# Patient Record
Sex: FEMALE | Race: WHITE | Employment: OTHER | URBAN - METROPOLITAN AREA
[De-identification: names, ages, dates, MRNs, and addresses within clinical notes are randomized per-mention and may not be internally consistent; named-entity substitution may affect disease eponyms.]

---

## 2017-11-13 ENCOUNTER — APPOINTMENT (OUTPATIENT)
Dept: CT IMAGING | Age: 62
DRG: 432 | End: 2017-11-13
Payer: COMMERCIAL

## 2017-11-13 ENCOUNTER — HOSPITAL ENCOUNTER (INPATIENT)
Age: 62
LOS: 3 days | Discharge: HOME | DRG: 432 | End: 2017-11-16
Attending: EMERGENCY MEDICINE | Admitting: FAMILY MEDICINE
Payer: COMMERCIAL

## 2017-11-13 DIAGNOSIS — R10.84 GENERALIZED ABDOMINAL PAIN: ICD-10-CM

## 2017-11-13 DIAGNOSIS — K72.00 ACUTE LIVER FAILURE WITHOUT HEPATIC COMA: Primary | ICD-10-CM

## 2017-11-13 DIAGNOSIS — R17 JAUNDICE, NON-NEONATAL: ICD-10-CM

## 2017-11-13 LAB
-: ABNORMAL
ABSOLUTE EOS #: 0.1 K/UL (ref 0–0.4)
ABSOLUTE IMMATURE GRANULOCYTE: ABNORMAL K/UL (ref 0–0.3)
ABSOLUTE LYMPH #: 1 K/UL (ref 1–4.8)
ABSOLUTE MONO #: 0.7 K/UL (ref 0.2–0.8)
ALBUMIN SERPL-MCNC: 3.4 G/DL (ref 3.5–5.2)
ALBUMIN/GLOBULIN RATIO: ABNORMAL (ref 1–2.5)
ALP BLD-CCNC: 228 U/L (ref 35–104)
ALT SERPL-CCNC: 89 U/L (ref 5–33)
AMMONIA: 56 UMOL/L (ref 11–51)
AMORPHOUS: ABNORMAL
ANION GAP SERPL CALCULATED.3IONS-SCNC: 18 MMOL/L (ref 9–17)
AST SERPL-CCNC: 226 U/L
BACTERIA: ABNORMAL
BASOPHILS # BLD: 0 %
BASOPHILS ABSOLUTE: 0 K/UL (ref 0–0.2)
BILIRUB SERPL-MCNC: 14.14 MG/DL (ref 0.3–1.2)
BILIRUBIN DIRECT: YES MG/DL
BILIRUBIN URINE: ABNORMAL
BILIRUBIN, INDIRECT: ABNORMAL MG/DL (ref 0–1)
BUN BLDV-MCNC: 14 MG/DL (ref 8–23)
BUN/CREAT BLD: 21 (ref 9–20)
CALCIUM SERPL-MCNC: 9.1 MG/DL (ref 8.6–10.4)
CASTS UA: ABNORMAL /LPF
CHLORIDE BLD-SCNC: 84 MMOL/L (ref 98–107)
CO2: 28 MMOL/L (ref 20–31)
COLOR: ABNORMAL
COMMENT UA: ABNORMAL
CREAT SERPL-MCNC: 0.67 MG/DL (ref 0.5–0.9)
CRYSTALS, UA: ABNORMAL /HPF
DIFFERENTIAL TYPE: ABNORMAL
EOSINOPHILS RELATIVE PERCENT: 1 %
EPITHELIAL CELLS UA: ABNORMAL /HPF
GFR AFRICAN AMERICAN: >60 ML/MIN
GFR NON-AFRICAN AMERICAN: >60 ML/MIN
GFR SERPL CREATININE-BSD FRML MDRD: ABNORMAL ML/MIN/{1.73_M2}
GFR SERPL CREATININE-BSD FRML MDRD: ABNORMAL ML/MIN/{1.73_M2}
GLOBULIN: ABNORMAL G/DL (ref 1.5–3.8)
GLUCOSE BLD-MCNC: 121 MG/DL (ref 70–99)
GLUCOSE URINE: ABNORMAL
HAV IGM SER IA-ACNC: NONREACTIVE
HCT VFR BLD CALC: 33.1 % (ref 36–46)
HEMOGLOBIN: 11.7 G/DL (ref 12–16)
HEPATITIS B CORE IGM ANTIBODY: NONREACTIVE
HEPATITIS B SURFACE ANTIGEN: NONREACTIVE
HEPATITIS C ANTIBODY: NONREACTIVE
IMMATURE GRANULOCYTES: ABNORMAL %
INR BLD: 1.6
KETONES, URINE: ABNORMAL
LEUKOCYTE ESTERASE, URINE: ABNORMAL
LIPASE: 102 U/L (ref 13–60)
LYMPHOCYTES # BLD: 13 %
MCH RBC QN AUTO: 39.4 PG (ref 26–34)
MCHC RBC AUTO-ENTMCNC: 35.3 G/DL (ref 31–37)
MCV RBC AUTO: 111.9 FL (ref 80–100)
MONOCYTES # BLD: 9 %
MUCUS: ABNORMAL
NITRITE, URINE: ABNORMAL
OTHER OBSERVATIONS UA: ABNORMAL
PARTIAL THROMBOPLASTIN TIME: 29.9 SEC (ref 23–31)
PDW BLD-RTO: 15.5 % (ref 11.5–14.5)
PH UA: 5.5 (ref 5–8)
PLATELET # BLD: 87 K/UL (ref 130–400)
PLATELET ESTIMATE: ABNORMAL
PMV BLD AUTO: 8.4 FL (ref 6–12)
POTASSIUM SERPL-SCNC: 3.3 MMOL/L (ref 3.7–5.3)
PROTEIN UA: ABNORMAL
PROTHROMBIN TIME: 16.7 SEC (ref 9.7–11.6)
RBC # BLD: 2.96 M/UL (ref 4–5.2)
RBC # BLD: ABNORMAL 10*6/UL
RBC UA: ABNORMAL /HPF (ref 0–2)
RENAL EPITHELIAL, UA: ABNORMAL /HPF
SEG NEUTROPHILS: 77 %
SEGMENTED NEUTROPHILS ABSOLUTE COUNT: 5.9 K/UL (ref 1.8–7.7)
SODIUM BLD-SCNC: 130 MMOL/L (ref 135–144)
SPECIFIC GRAVITY UA: 1.01 (ref 1–1.03)
TOTAL PROTEIN: 7.1 G/DL (ref 6.4–8.3)
TRICHOMONAS: ABNORMAL
TURBIDITY: CLEAR
URINE HGB: ABNORMAL
UROBILINOGEN, URINE: ABNORMAL
WBC # BLD: 7.7 K/UL (ref 3.5–11)
WBC # BLD: ABNORMAL 10*3/UL
WBC UA: ABNORMAL /HPF (ref 0–5)
YEAST: ABNORMAL

## 2017-11-13 PROCEDURE — 2580000003 HC RX 258: Performed by: FAMILY MEDICINE

## 2017-11-13 PROCEDURE — 80048 BASIC METABOLIC PNL TOTAL CA: CPT

## 2017-11-13 PROCEDURE — 6360000004 HC RX CONTRAST MEDICATION: Performed by: EMERGENCY MEDICINE

## 2017-11-13 PROCEDURE — 80074 ACUTE HEPATITIS PANEL: CPT

## 2017-11-13 PROCEDURE — 83690 ASSAY OF LIPASE: CPT

## 2017-11-13 PROCEDURE — 2500000003 HC RX 250 WO HCPCS: Performed by: EMERGENCY MEDICINE

## 2017-11-13 PROCEDURE — 96375 TX/PRO/DX INJ NEW DRUG ADDON: CPT

## 2017-11-13 PROCEDURE — 1200000000 HC SEMI PRIVATE

## 2017-11-13 PROCEDURE — 80076 HEPATIC FUNCTION PANEL: CPT

## 2017-11-13 PROCEDURE — 96366 THER/PROPH/DIAG IV INF ADDON: CPT

## 2017-11-13 PROCEDURE — 85730 THROMBOPLASTIN TIME PARTIAL: CPT

## 2017-11-13 PROCEDURE — 6370000000 HC RX 637 (ALT 250 FOR IP): Performed by: FAMILY MEDICINE

## 2017-11-13 PROCEDURE — 82140 ASSAY OF AMMONIA: CPT

## 2017-11-13 PROCEDURE — 85025 COMPLETE CBC W/AUTO DIFF WBC: CPT

## 2017-11-13 PROCEDURE — 99285 EMERGENCY DEPT VISIT HI MDM: CPT

## 2017-11-13 PROCEDURE — 85610 PROTHROMBIN TIME: CPT

## 2017-11-13 PROCEDURE — 96365 THER/PROPH/DIAG IV INF INIT: CPT

## 2017-11-13 PROCEDURE — 74177 CT ABD & PELVIS W/CONTRAST: CPT

## 2017-11-13 PROCEDURE — C9113 INJ PANTOPRAZOLE SODIUM, VIA: HCPCS | Performed by: FAMILY MEDICINE

## 2017-11-13 PROCEDURE — 6360000002 HC RX W HCPCS: Performed by: EMERGENCY MEDICINE

## 2017-11-13 PROCEDURE — 6360000002 HC RX W HCPCS: Performed by: FAMILY MEDICINE

## 2017-11-13 PROCEDURE — 2580000003 HC RX 258: Performed by: EMERGENCY MEDICINE

## 2017-11-13 PROCEDURE — 81001 URINALYSIS AUTO W/SCOPE: CPT

## 2017-11-13 RX ORDER — METOPROLOL SUCCINATE 25 MG/1
25 TABLET, EXTENDED RELEASE ORAL DAILY
Status: DISCONTINUED | OUTPATIENT
Start: 2017-11-13 | End: 2017-11-16 | Stop reason: HOSPADM

## 2017-11-13 RX ORDER — 0.9 % SODIUM CHLORIDE 0.9 %
1000 INTRAVENOUS SOLUTION INTRAVENOUS ONCE
Status: COMPLETED | OUTPATIENT
Start: 2017-11-13 | End: 2017-11-13

## 2017-11-13 RX ORDER — ASPIRIN 325 MG
325 TABLET ORAL EVERY EVENING
Status: ON HOLD | COMMUNITY
End: 2017-11-16 | Stop reason: HOSPADM

## 2017-11-13 RX ORDER — LACTULOSE 10 G/15ML
10 SOLUTION ORAL 3 TIMES DAILY
Status: DISCONTINUED | OUTPATIENT
Start: 2017-11-13 | End: 2017-11-16 | Stop reason: HOSPADM

## 2017-11-13 RX ORDER — 0.9 % SODIUM CHLORIDE 0.9 %
50 INTRAVENOUS SOLUTION INTRAVENOUS ONCE
Status: COMPLETED | OUTPATIENT
Start: 2017-11-13 | End: 2017-11-13

## 2017-11-13 RX ORDER — SODIUM CHLORIDE 9 MG/ML
INJECTION, SOLUTION INTRAVENOUS CONTINUOUS
Status: DISCONTINUED | OUTPATIENT
Start: 2017-11-13 | End: 2017-11-16 | Stop reason: HOSPADM

## 2017-11-13 RX ORDER — SODIUM CHLORIDE 0.9 % (FLUSH) 0.9 %
10 SYRINGE (ML) INJECTION PRN
Status: DISCONTINUED | OUTPATIENT
Start: 2017-11-13 | End: 2017-11-16 | Stop reason: HOSPADM

## 2017-11-13 RX ORDER — SODIUM CHLORIDE 0.9 % (FLUSH) 0.9 %
10 SYRINGE (ML) INJECTION EVERY 12 HOURS SCHEDULED
Status: DISCONTINUED | OUTPATIENT
Start: 2017-11-13 | End: 2017-11-16 | Stop reason: HOSPADM

## 2017-11-13 RX ORDER — ONDANSETRON 2 MG/ML
4 INJECTION INTRAMUSCULAR; INTRAVENOUS EVERY 6 HOURS PRN
Status: DISCONTINUED | OUTPATIENT
Start: 2017-11-13 | End: 2017-11-16 | Stop reason: HOSPADM

## 2017-11-13 RX ORDER — SODIUM CHLORIDE 0.9 % (FLUSH) 0.9 %
10 SYRINGE (ML) INJECTION PRN
Status: DISCONTINUED | OUTPATIENT
Start: 2017-11-13 | End: 2017-11-13 | Stop reason: SDUPTHER

## 2017-11-13 RX ORDER — METOPROLOL SUCCINATE 25 MG/1
25 TABLET, EXTENDED RELEASE ORAL DAILY
Status: ON HOLD | COMMUNITY
End: 2017-12-15 | Stop reason: ALTCHOICE

## 2017-11-13 RX ORDER — ONDANSETRON 2 MG/ML
4 INJECTION INTRAMUSCULAR; INTRAVENOUS EVERY 30 MIN PRN
Status: DISCONTINUED | OUTPATIENT
Start: 2017-11-13 | End: 2017-11-13 | Stop reason: SDUPTHER

## 2017-11-13 RX ADMIN — IOPAMIDOL 125 ML: 755 INJECTION, SOLUTION INTRAVENOUS at 12:18

## 2017-11-13 RX ADMIN — SODIUM CHLORIDE 40 MG: 9 INJECTION, SOLUTION INTRAVENOUS at 17:24

## 2017-11-13 RX ADMIN — SODIUM CHLORIDE 1000 ML: 9 INJECTION, SOLUTION INTRAVENOUS at 10:25

## 2017-11-13 RX ADMIN — ONDANSETRON 4 MG: 2 INJECTION INTRAMUSCULAR; INTRAVENOUS at 10:26

## 2017-11-13 RX ADMIN — FOLIC ACID: 5 INJECTION, SOLUTION INTRAMUSCULAR; INTRAVENOUS; SUBCUTANEOUS at 11:29

## 2017-11-13 RX ADMIN — SODIUM CHLORIDE 50 ML: 9 INJECTION, SOLUTION INTRAVENOUS at 12:18

## 2017-11-13 RX ADMIN — METOPROLOL SUCCINATE 25 MG: 25 TABLET, FILM COATED, EXTENDED RELEASE ORAL at 17:27

## 2017-11-13 RX ADMIN — Medication 10 ML: at 12:18

## 2017-11-13 RX ADMIN — LACTULOSE 10 G: 20 SOLUTION ORAL at 17:24

## 2017-11-13 ASSESSMENT — ENCOUNTER SYMPTOMS
COUGH: 0
COLOR CHANGE: 1
CHEST TIGHTNESS: 0
ABDOMINAL DISTENTION: 1
VOICE CHANGE: 0
SHORTNESS OF BREATH: 0
ABDOMINAL PAIN: 1
NAUSEA: 1

## 2017-11-13 ASSESSMENT — PAIN DESCRIPTION - PAIN TYPE: TYPE: ACUTE PAIN

## 2017-11-13 ASSESSMENT — PAIN SCALES - GENERAL: PAINLEVEL_OUTOF10: 8

## 2017-11-13 ASSESSMENT — PAIN DESCRIPTION - DESCRIPTORS: DESCRIPTORS: ACHING

## 2017-11-13 NOTE — ED NOTES
Pt ambulatory to room 19 with c/o lower abd pain and nausea, onset approximately 3 days ago. Pt states she is \"Not able to hold down any solid foods\" but \"Liquids seem to go down okay\". Pt reports she has been drinking Ensure for past 2 days without difficulty, but is still experiencing nausea and lower abd pain. Skin/eyes jaundiced. Pt denies any EtOH abuse, states \"I have a glass of wine very rarely\". Pt denies any CP, SOB, dizziness, urinary symptoms, or fevers. NAD noted.       Rosa M Russell RN  11/13/17 1007

## 2017-11-13 NOTE — ED PROVIDER NOTES
[facility] ED  eMERGENCY dEPARTMENT eNCOUnter      Pt Name: Db Abbott  MRN: [id number]  [place] 1955  Date of evaluation: 11/13/2017  Provider: Claudeen Juba, MD    CHIEF COMPLAINT       Chief Complaint   Patient presents with    Abdominal Pain         HISTORY OF PRESENT ILLNESS  (Location/Symptom, Timing/Onset, Context/Setting, Quality, Duration, Modifying Factors, Severity.)   Db Abbott is a 57 y.o. female who presents to the emergency department With abdominal pain she states that she is noticed that sure abdomen is distended and hurts diffusely. Feels like she is not eating well she doesn't have much energy. She's noticed no diarrhea but she's had some mild nausea and occasional vomiting. He notes no skin change it's now abnormal or travel history. Does admit to using alcohol on occasion. Nursing Notes were reviewed. ALLERGIES     Review of patient's allergies indicates no known allergies. CURRENT MEDICATIONS       Previous Medications    No medications on file       PAST MEDICAL HISTORY   History reviewed. No pertinent past medical history. SURGICAL HISTORY     History reviewed. No pertinent surgical history. FAMILY HISTORY     History reviewed. No pertinent family history. No family status information on file. SOCIAL HISTORY      reports that she has never smoked. She has never used smokeless tobacco. She reports that she drinks about 0.6 oz of alcohol per week . REVIEW OF SYSTEMS    (2-9 systems for level 4, 10 or more for level 5)     Review of Systems   Constitutional: Positive for activity change and appetite change. HENT: Negative for voice change. Respiratory: Negative for cough, chest tightness and shortness of breath. Cardiovascular: Negative for chest pain. Gastrointestinal: Positive for abdominal distention, abdominal pain and nausea. Genitourinary: Negative for difficulty urinating, dysuria, flank pain and pelvic pain.    Musculoskeletal: Negative. Skin: Positive for color change and pallor. Neurological: Negative for weakness and headaches. Except as noted above the remainder of the review of systems was reviewed and negative. PHYSICAL EXAM    (up to 7 for level 4, 8 or more for level 5)     ED Triage Vitals [11/13/17 0929]   BP Temp Temp src Pulse Resp SpO2 Height Weight   135/82 98 °F (36.7 °C) -- 89 18 97 % 5' 4\" (1.626 m) 145 lb (65.8 kg)       Physical Exam   Constitutional: She is oriented to person, place, and time. She appears well-developed. She is cooperative. HENT:   Head: Normocephalic and atraumatic. Eyes: Pupils are equal, round, and reactive to light. Scleral icterus is present. Neck: Neck supple. No JVD present. No tracheal deviation present. Cardiovascular: Normal rate, regular rhythm and normal heart sounds. No murmur heard. Pulmonary/Chest: Effort normal and breath sounds normal. No respiratory distress. She has no wheezes. Abdominal: She exhibits distension and ascites. There is tenderness. Musculoskeletal: Normal range of motion. Lymphadenopathy:     She has no cervical adenopathy. Neurological: She is alert and oriented to person, place, and time. Skin: Skin is warm, dry and intact. DIAGNOSTIC RESULTS     EKG: All EKG's are interpreted by the Emergency Department Physician who either signs or Co-signs this chart in the absence of a cardiologist.      RADIOLOGY:   Non-plain film images such as CT, Ultrasound and MRI are read by the radiologist. Plain radiographic images are visualized and preliminarily interpreted by the emergency physician with the below findings:      No results found.   Interpretation per the Radiologist below, if available at the time of this note:    CT ABDOMEN PELVIS W IV CONTRAST Additional Contrast? Radiologist Recommendation    (Results Pending)         ED BEDSIDE ULTRASOUND:   Performed by ED Physician - none    LABS:  Labs Reviewed   CBC WITH AUTO DIFFERENTIAL - Abnormal; Notable for the following:        Result Value    RBC 2.96 (*)     Hemoglobin 11.7 (*)     Hematocrit 33.1 (*)     .9 (*)     MCH 39.4 (*)     RDW 15.5 (*)     Platelets 87 (*)     All other components within normal limits   BASIC METABOLIC PANEL - Abnormal; Notable for the following:     Glucose 121 (*)     Bun/Cre Ratio 21 (*)     Sodium 130 (*)     Potassium 3.3 (*)     Chloride 84 (*)     Anion Gap 18 (*)     All other components within normal limits   HEPATIC FUNCTION PANEL - Abnormal; Notable for the following:     Alb 3.4 (*)     Alkaline Phosphatase 228 (*)     ALT 89 (*)      (*)     Total Bilirubin 14.14 (*)     All other components within normal limits   LIPASE - Abnormal; Notable for the following:     Lipase 102 (*)     All other components within normal limits   URINALYSIS - Abnormal; Notable for the following:     Color, UA Azo dye present. (*)     Urine Hgb TRACE (*)     All other components within normal limits   MICROSCOPIC URINALYSIS - Abnormal; Notable for the following:     Bacteria, UA MANY (*)     All other components within normal limits   HEPATITIS PANEL, ACUTE   PROTIME-INR   APTT       All other labs were within normal range or not returned as of this dictation. EMERGENCY DEPARTMENT COURSE and DIFFERENTIAL DIAGNOSIS/MDM:   Vitals:    Vitals:    17 0929   BP: 135/82   Pulse: 89   Resp: 18   Temp: 98 °F (36.7 °C)   SpO2: 97%   Weight: 145 lb (65.8 kg)   Height: 5' 4\" (1.626 m)     Patient agrees with admission is to find out why what treatments we can do for her. CONSULTS:  None    PROCEDURES:  Procedures    FINAL IMPRESSION      1. Acute liver failure without hepatic coma    2. Generalized abdominal pain    3. Jaundice, non-          DISPOSITION/PLAN   DISPOSITION     PATIENT REFERRED TO:   No follow-up provider specified.     DISCHARGE MEDICATIONS:     New Prescriptions    No medications on file           (Please note that portions of this note were completed with a voice recognition program.  Efforts were made to edit the dictations but occasionally words are mis-transcribed.)    Gracía Burrell MD  Attending Emergency Physician          García Burrell MD  11/13/17 6188

## 2017-11-13 NOTE — PROGRESS NOTES
Pharmacy Accuracy Service Medication History Note    The patient's list of current home medications has been reviewed. The patient's allergy list has been reviewed and updated. Source(s) of information: Patient, [facility] ([phone])    Based on information provided by the above source(s), I have updated the patient's home med list as described below. Please review the ACTION REQUESTED BY PHYSICIAN section of this note for any discrepancies on current hospital orders. I changed or updated the following medications on the patient's home medication list:  Discontinued · None     Added · Metoprolol ER 25mg- once daily  · Woman's One-a-day- once daily  · Vit. D 1,000units- once daily  · Aspirin 325mg- QPM     Adjusted   · None     Other Notes · None          PHYSICIAN ACTION REQUESTED  Discrepancies on current hospital orders that need to be addressed by a physician:    Medication Action Requested   Home meds     Please review home medications and order as appropriate. Please feel free to call me with any questions about this encounter. Thank you.     This note will be reviewed and co-signed by the Medication Accuracy Pharmacist.    Ron Nichols PharmD student  Pharmacy Medication Accuracy Review Service  Phone:  [phone]  Fax: [phone]      Electronically signed by Ron Nichols on 11/13/2017 at 1:15 PM

## 2017-11-14 ENCOUNTER — APPOINTMENT (OUTPATIENT)
Dept: NUCLEAR MEDICINE | Age: 62
DRG: 432 | End: 2017-11-14
Payer: COMMERCIAL

## 2017-11-14 PROBLEM — E87.6 HYPOKALEMIA: Status: ACTIVE | Noted: 2017-11-14

## 2017-11-14 PROBLEM — K74.60 CIRRHOSIS (HCC): Status: ACTIVE | Noted: 2017-11-14

## 2017-11-14 LAB
ALBUMIN SERPL-MCNC: 2.8 G/DL (ref 3.5–5.2)
ALBUMIN/GLOBULIN RATIO: ABNORMAL (ref 1–2.5)
ALP BLD-CCNC: 184 U/L (ref 35–104)
ALT SERPL-CCNC: 72 U/L (ref 5–33)
AMMONIA: 103 UMOL/L (ref 11–51)
ANION GAP SERPL CALCULATED.3IONS-SCNC: 15 MMOL/L (ref 9–17)
ANION GAP SERPL CALCULATED.3IONS-SCNC: 15 MMOL/L (ref 9–17)
AST SERPL-CCNC: 180 U/L
BILIRUB SERPL-MCNC: 12.21 MG/DL (ref 0.3–1.2)
BUN BLDV-MCNC: 13 MG/DL (ref 8–23)
BUN BLDV-MCNC: 13 MG/DL (ref 8–23)
BUN/CREAT BLD: 19 (ref 9–20)
BUN/CREAT BLD: ABNORMAL (ref 9–20)
CALCIUM SERPL-MCNC: 7.9 MG/DL (ref 8.6–10.4)
CALCIUM SERPL-MCNC: 8.1 MG/DL (ref 8.6–10.4)
CHLORIDE BLD-SCNC: 92 MMOL/L (ref 98–107)
CHLORIDE BLD-SCNC: 93 MMOL/L (ref 98–107)
CO2: 24 MMOL/L (ref 20–31)
CO2: 25 MMOL/L (ref 20–31)
CREAT SERPL-MCNC: 0.69 MG/DL (ref 0.5–0.9)
CREAT SERPL-MCNC: <0.4 MG/DL (ref 0.5–0.9)
GFR AFRICAN AMERICAN: >60 ML/MIN
GFR AFRICAN AMERICAN: ABNORMAL ML/MIN
GFR NON-AFRICAN AMERICAN: >60 ML/MIN
GFR NON-AFRICAN AMERICAN: ABNORMAL ML/MIN
GFR SERPL CREATININE-BSD FRML MDRD: ABNORMAL ML/MIN/{1.73_M2}
GLUCOSE BLD-MCNC: 150 MG/DL (ref 70–99)
GLUCOSE BLD-MCNC: 84 MG/DL (ref 70–99)
HCT VFR BLD CALC: 28.7 % (ref 36–46)
HEMOGLOBIN: 9.9 G/DL (ref 12–16)
INR BLD: 1.7
MCH RBC QN AUTO: 38.8 PG (ref 26–34)
MCHC RBC AUTO-ENTMCNC: 34.4 G/DL (ref 31–37)
MCV RBC AUTO: 112.7 FL (ref 80–100)
PDW BLD-RTO: 15.6 % (ref 11.5–14.5)
PLATELET # BLD: 88 K/UL (ref 130–400)
PMV BLD AUTO: 8.2 FL (ref 6–12)
POTASSIUM SERPL-SCNC: 2.7 MMOL/L (ref 3.7–5.3)
POTASSIUM SERPL-SCNC: 3.8 MMOL/L (ref 3.7–5.3)
PROTHROMBIN TIME: 17.4 SEC (ref 9.7–11.6)
RBC # BLD: 2.55 M/UL (ref 4–5.2)
SODIUM BLD-SCNC: 131 MMOL/L (ref 135–144)
SODIUM BLD-SCNC: 133 MMOL/L (ref 135–144)
TOTAL PROTEIN: 5.9 G/DL (ref 6.4–8.3)
WBC # BLD: 6.8 K/UL (ref 3.5–11)

## 2017-11-14 PROCEDURE — 80048 BASIC METABOLIC PNL TOTAL CA: CPT

## 2017-11-14 PROCEDURE — 82140 ASSAY OF AMMONIA: CPT

## 2017-11-14 PROCEDURE — 85610 PROTHROMBIN TIME: CPT

## 2017-11-14 PROCEDURE — 6360000002 HC RX W HCPCS: Performed by: FAMILY MEDICINE

## 2017-11-14 PROCEDURE — A9537 TC99M MEBROFENIN: HCPCS | Performed by: FAMILY MEDICINE

## 2017-11-14 PROCEDURE — 2580000003 HC RX 258: Performed by: FAMILY MEDICINE

## 2017-11-14 PROCEDURE — 36415 COLL VENOUS BLD VENIPUNCTURE: CPT

## 2017-11-14 PROCEDURE — 78226 HEPATOBILIARY SYSTEM IMAGING: CPT

## 2017-11-14 PROCEDURE — 99254 IP/OBS CNSLTJ NEW/EST MOD 60: CPT | Performed by: INTERNAL MEDICINE

## 2017-11-14 PROCEDURE — 85027 COMPLETE CBC AUTOMATED: CPT

## 2017-11-14 PROCEDURE — 6370000000 HC RX 637 (ALT 250 FOR IP): Performed by: FAMILY MEDICINE

## 2017-11-14 PROCEDURE — C9113 INJ PANTOPRAZOLE SODIUM, VIA: HCPCS | Performed by: FAMILY MEDICINE

## 2017-11-14 PROCEDURE — 80053 COMPREHEN METABOLIC PANEL: CPT

## 2017-11-14 PROCEDURE — 3430000000 HC RX DIAGNOSTIC RADIOPHARMACEUTICAL: Performed by: FAMILY MEDICINE

## 2017-11-14 PROCEDURE — 1200000000 HC SEMI PRIVATE

## 2017-11-14 RX ORDER — POTASSIUM CHLORIDE 20 MEQ/1
40 TABLET, EXTENDED RELEASE ORAL PRN
Status: DISCONTINUED | OUTPATIENT
Start: 2017-11-14 | End: 2017-11-16 | Stop reason: HOSPADM

## 2017-11-14 RX ORDER — POTASSIUM CHLORIDE 29.8 MG/ML
20 INJECTION INTRAVENOUS PRN
Status: DISCONTINUED | OUTPATIENT
Start: 2017-11-14 | End: 2017-11-14

## 2017-11-14 RX ORDER — OXYCODONE HYDROCHLORIDE AND ACETAMINOPHEN 5; 325 MG/1; MG/1
1 TABLET ORAL EVERY 4 HOURS PRN
Status: DISCONTINUED | OUTPATIENT
Start: 2017-11-14 | End: 2017-11-14

## 2017-11-14 RX ORDER — OXYCODONE HYDROCHLORIDE AND ACETAMINOPHEN 5; 325 MG/1; MG/1
1 TABLET ORAL EVERY 6 HOURS PRN
Status: DISCONTINUED | OUTPATIENT
Start: 2017-11-14 | End: 2017-11-16 | Stop reason: HOSPADM

## 2017-11-14 RX ORDER — POTASSIUM CHLORIDE 7.45 MG/ML
10 INJECTION INTRAVENOUS PRN
Status: DISCONTINUED | OUTPATIENT
Start: 2017-11-14 | End: 2017-11-16 | Stop reason: HOSPADM

## 2017-11-14 RX ORDER — PANTOPRAZOLE SODIUM 40 MG/10ML
40 INJECTION, POWDER, LYOPHILIZED, FOR SOLUTION INTRAVENOUS DAILY
Status: DISCONTINUED | OUTPATIENT
Start: 2017-11-14 | End: 2017-11-16 | Stop reason: ALTCHOICE

## 2017-11-14 RX ORDER — POTASSIUM CHLORIDE 20MEQ/15ML
40 LIQUID (ML) ORAL PRN
Status: DISCONTINUED | OUTPATIENT
Start: 2017-11-14 | End: 2017-11-16 | Stop reason: HOSPADM

## 2017-11-14 RX ORDER — 0.9 % SODIUM CHLORIDE 0.9 %
10 VIAL (ML) INJECTION DAILY
Status: DISCONTINUED | OUTPATIENT
Start: 2017-11-14 | End: 2017-11-16 | Stop reason: ALTCHOICE

## 2017-11-14 RX ADMIN — LACTULOSE 10 G: 20 SOLUTION ORAL at 21:19

## 2017-11-14 RX ADMIN — POTASSIUM CHLORIDE 10 MEQ: 10 INJECTION, SOLUTION INTRAVENOUS at 08:36

## 2017-11-14 RX ADMIN — PANTOPRAZOLE SODIUM 40 MG: 40 INJECTION, POWDER, FOR SOLUTION INTRAVENOUS at 08:36

## 2017-11-14 RX ADMIN — METOPROLOL SUCCINATE 25 MG: 25 TABLET, FILM COATED, EXTENDED RELEASE ORAL at 15:40

## 2017-11-14 RX ADMIN — Medication 4.7 MILLICURIE: at 12:10

## 2017-11-14 RX ADMIN — SODIUM CHLORIDE: 9 INJECTION, SOLUTION INTRAVENOUS at 06:46

## 2017-11-14 RX ADMIN — SODIUM CHLORIDE: 9 INJECTION, SOLUTION INTRAVENOUS at 23:52

## 2017-11-14 RX ADMIN — POTASSIUM CHLORIDE 10 MEQ: 10 INJECTION, SOLUTION INTRAVENOUS at 18:14

## 2017-11-14 RX ADMIN — OXYCODONE HYDROCHLORIDE AND ACETAMINOPHEN 1 TABLET: 5; 325 TABLET ORAL at 00:53

## 2017-11-14 RX ADMIN — Medication 10 ML: at 08:37

## 2017-11-14 RX ADMIN — POTASSIUM CHLORIDE 10 MEQ: 10 INJECTION, SOLUTION INTRAVENOUS at 16:57

## 2017-11-14 RX ADMIN — POTASSIUM CHLORIDE 10 MEQ: 10 INJECTION, SOLUTION INTRAVENOUS at 15:39

## 2017-11-14 RX ADMIN — POTASSIUM CHLORIDE 10 MEQ: 10 INJECTION, SOLUTION INTRAVENOUS at 19:31

## 2017-11-14 RX ADMIN — POTASSIUM CHLORIDE 10 MEQ: 10 INJECTION, SOLUTION INTRAVENOUS at 10:02

## 2017-11-14 RX ADMIN — Medication 10 ML: at 08:48

## 2017-11-14 RX ADMIN — LACTULOSE 10 G: 20 SOLUTION ORAL at 15:40

## 2017-11-14 RX ADMIN — LACTULOSE 10 G: 20 SOLUTION ORAL at 00:48

## 2017-11-14 ASSESSMENT — PAIN SCALES - GENERAL
PAINLEVEL_OUTOF10: 0
PAINLEVEL_OUTOF10: 7
PAINLEVEL_OUTOF10: 0

## 2017-11-14 NOTE — CONSULTS
Consult to GI  Consult performed by: Jaden Born  Consult ordered by: Willi Paredes           GI Consult Note:    Name: Gpoi Guevara  MRN: [id number]     Milla Reid: [de-identified]  Room: 2018/2018-02    Admit Date: 11/13/2017  PCP: Clay Cerda MD    Physician Requesting Consult: Clay Cerda MD     Reason for Consult:  Ascites  Cirrhosis  Hx of ETOH abuse  Abdominal pains  Severe jaundice  Elevated LFTs  Portal hypertension   ? Liver lesion   H Encephalopathy     Chief Complaint:     Chief Complaint   Patient presents with    Abdominal Pain       History Obtained From:     Patient     History of Present Illness:      Gopi Guevara is a  57 y.o.  female who presents with Abdominal Pain    This patient has been admitted with gradully worisening abdominal pains and distention  She has hx of ETOH abuse until one year when she says that she stopped drinking  She also claims that she has hx of fatty liver  She is found to have elevated LFTs and marked elevation of the bilirubin  She has no previous hx of such episodes  Has hx of colon polyps   Has hx of GERD and is taking PPI  Has no overt bleeding  Her CT has shown evidence of portal hypertension and ascites  There is ? Liver lesion      Symptoms:  Onset:  Location:  abdomen  Duration:  week(s)  Severity:  mild, moderate  Quality:  intermittent      Past Medical History:     Past Medical History:   Diagnosis Date    Hypertension         Past Surgical History:     History reviewed. No pertinent surgical history. Medications Prior to Admission:       Prior to Admission medications    Medication Sig Start Date End Date Taking?  Authorizing Provider   Multiple Vitamins-Minerals (WOMENS MULTIVITAMIN PO) Take 1 tablet by mouth daily   Yes Historical Provider, MD   vitamin D (CHOLECALCIFEROL) 1000 UNIT TABS tablet Take 1,000 Units by mouth daily   Yes Historical Provider, MD   aspirin 325 MG tablet Take 325 mg by mouth every evening   Yes Historical Provider, MD rate, regular rhythm, no murmurs  Abdomen - soft, + tenderness, marked distended, bowel sounds present all four quadrants, no masses, + hepatomegaly or splenomegaly.  + umbilical hernia  Neurological - normal speech, no focal findings or movement disorder noted, cranial nerves II through XII grossly intact  Extremities - peripheral pulses palpable, + pedal edema or calf pain with palpation  Skin - Icteric  Cranial Nerves : grossly intact  Lymph nodes: not palpable    Data:   CBC:   Lab Results   Component Value Date    WBC 6.8 11/14/2017    RBC 2.55 11/14/2017    HGB 9.9 11/14/2017    HCT 28.7 11/14/2017    .7 11/14/2017    MCH 38.8 11/14/2017    MCHC 34.4 11/14/2017    RDW 15.6 11/14/2017    PLT 88 11/14/2017    MPV 8.2 11/14/2017     CBC with Differential:    Lab Results   Component Value Date    WBC 6.8 11/14/2017    RBC 2.55 11/14/2017    HGB 9.9 11/14/2017    HCT 28.7 11/14/2017    PLT 88 11/14/2017    .7 11/14/2017    MCH 38.8 11/14/2017    MCHC 34.4 11/14/2017    RDW 15.6 11/14/2017    LYMPHOPCT 13 11/13/2017    MONOPCT 9 11/13/2017    BASOPCT 0 11/13/2017    MONOSABS 0.70 11/13/2017    LYMPHSABS 1.00 11/13/2017    EOSABS 0.10 11/13/2017    BASOSABS 0.00 11/13/2017    DIFFTYPE NOT REPORTED 11/13/2017     Hemoglobin/Hematocrit:    Lab Results   Component Value Date    HGB 9.9 11/14/2017    HCT 28.7 11/14/2017     CMP:    Lab Results   Component Value Date     11/14/2017    K 2.7 11/14/2017    CL 93 11/14/2017    CO2 25 11/14/2017    BUN 13 11/14/2017    CREATININE 0.69 11/14/2017    GFRAA >60 11/14/2017    LABGLOM >60 11/14/2017    GLUCOSE 84 11/14/2017    PROT 5.9 11/14/2017    LABALBU 2.8 11/14/2017    CALCIUM 7.9 11/14/2017    BILITOT 12.21 11/14/2017    ALKPHOS 184 11/14/2017     11/14/2017    ALT 72 11/14/2017     BMP:    Lab Results   Component Value Date     11/14/2017    K 2.7 11/14/2017    CL 93 11/14/2017    CO2 25 11/14/2017    BUN 13 11/14/2017    LABALBU 2.8

## 2017-11-14 NOTE — H&P
Ammonia 103 (H) umol/L    Comment: Performed at [address] [place] [address] [address] [facility], [address] [place] [address]    [phone]       Comprehensive metabolic panel [[id number]] (Abnormal) Collected: 11/14/17 0548   Updated: 11/14/17 0628     Glucose 84 mg/dL    BUN 13 mg/dL    CREATININE 0.69 mg/dL    Comment: ICTERIC SPECIMEN       Bun/Cre Ratio 19    Calcium 7.9 (L) mg/dL    Sodium 133 (L) mmol/L    Potassium 2.7 (LL) mmol/L    Chloride 93 (L) mmol/L    CO2 25 mmol/L    Anion Gap 15 mmol/L    Alkaline Phosphatase 184 (H) U/L    ALT 72 (H) U/L     (H) U/L    Total Bilirubin 12.21 (H) mg/dL    Total Protein 5.9 (L) g/dL    Alb 2.8 (L) g/dL    Albumin/Globulin Ratio NOT REPORTED    GFR Non- >60 mL/min    GFR African American >60 mL/min    GFR Comment         Comment: Average GFR for 61-76 years old:    85 mL/min/1.73sq m   Chronic Kidney Disease:    <60 mL/min/1.73sq m   Kidney failure:    <15 mL/min/1.73sq m               eGFR calculated using average adult body mass. Additional eGFR calculator    available at:         "[employer].br         Performed at [address] [place] [address]    [phone]        GFR Staging NOT REPORTED   CBC [[id number]] (Abnormal) Collected: 11/14/17 0548   Updated: 11/14/17 0617     WBC 6.8 k/uL    RBC 2.55 (L) m/uL    Hemoglobin 9.9 (L) g/dL    Hematocrit 28.7 (L) %    .7 (H) fL    MCH 38.8 (H) pg    MCHC 34.4 g/dL    RDW 15.6 (H) %    Platelets 88 (L) k/uL    MPV 8.2 fL    Comment: Performed at [facility] [address]    [phone]       Protime-INR [[id number]] (Abnormal) Collected: 11/14/17 0548   Updated: 11/14/17 0614     Protime 17.4 (H) sec    INR 1.7    Comment:        Therapeutic Range:    Moderate Anticoagulant Intensity:      INR = 2.0-3.0    High Anticoagulant Intensity:      INR = 2.5-3. 5         High anticoagulant intensity for patients with a mechanical prosthetic heart    valve, thrombosis and antiphospholipid syndrome, or myocardial infarction. Performed at [address], [address] [facility]    [phone]       Ammonia [[id number]] (Abnormal) Collected: 11/13/17 1308   Updated: 11/13/17 1338    Specimen Source: Blood     Ammonia 56 (H) umol/L    Comment: Performed at [address], [address] [facility]    [phone]       Hepatitis Panel, Acute [[id number]] Collected: 11/13/17 1029   Updated: 11/13/17 1324    Specimen Source: Blood     Hepatitis B Surface Ag NONREACTIVE    Hepatitis C Ab NONREACTIVE    Comment:        The hepatitis C procedure used in our laboratory is a Chemiluminescent test    specific for three recombinant HCV antigens.  A negative anti-HCV result    indicates that the antibodies to hepatitis C virus are not present at this    time. Individuals with reactive anti-HCV should be considered infected and infectious    until proven otherwise.  Confirmation of all equivocal or reactive results is    recommended by ordering HCV RNA by PCR. Hep B Core Ab, IgM NONREACTIVE    Hep A IgM NONREACTIVE    Comment: Performed at [address] [phone]      CT ABDOMEN PELVIS W IV CONTRAST Additional Contrast? Radiologist Recommendation [[id number]] Collected: 11/13/17 1232   Updated: 11/13/17 1303    Narrative:     EXAMINATION:  CT OF THE ABDOMEN AND PELVIS WITH CONTRAST 11/13/2017 12:27 pm    TECHNIQUE:  CT of the abdomen and pelvis was performed with the administration of  intravenous contrast. Multiplanar reformatted images are provided for review. Dose modulation, iterative reconstruction, and/or weight based adjustment of  the mA/kV was utilized to reduce the radiation dose to as low as reasonably  achievable.     COMPARISON:  October 10, 2007    HISTORY:  ORDERING SYSTEM PROVIDED HISTORY: ABDOMINAL PAIN  TECHNOLOGIST PROVIDED HISTORY:  Additional Contrast?->Radiologist Recommendation    FINDINGS:  Lower Chest: Scattered platelike opacities at the lung bases most consistent  with subsegmental atelectasis. Organs: There is relative enlargement of the lateral segment left hepatic and  caudate lobes associated with surface contour nodularity of the liver.  In  addition, there is heterogeneous somewhat nodular parenchymal enhancement of  the right hepatic lobe.  The findings consistent with cirrhosis.    Mild  gastroesophageal varices are present as well as a recannulized umbilical vein. There is cholelithiasis.  Diffuse gallbladder wall thickening is nonspecific  in patient's with ascites.  No biliary ductal dilatation.  The spleen is the  mildly enlarged without focal lesion.  The pancreas and the adrenal glands  are unremarkable.  The kidneys enhance symmetrically without collecting  system dilatation.  No focal renal lesion. GI/Bowel: No bowel obstruction.  The appendix is normal.  There is moderate  colonic diverticulosis without evidence for diverticulitis. Pelvis: Heterogeneous enhancement of the uterus suggestive of a presence of  fibroids.  The under distended bladder is unremarkable. Peritoneum/Retroperitoneum: Moderate volume ascites is present.  No enlarged  lymph nodes by CT criteria in the abdomen or pelvis. Bones/Soft Tissues: Again demonstrated is grade 2 L5-S1 spondylolisthesis. Impression:     Cirrhosis with evidence of portal venous hypertension to include moderate  ascites, varices and mild splenomegaly.  Underlying liver lesion, especially  given the heterogeneous parenchymal enhancement of the right hepatic lobe is  not excluded. Cholelithiasis with nonspecific .  Nonspecific diffuse gallbladder wall  thickening.  If there is clinical suspicion for acute cholecystitis, consider  nuclear medicine HIDA scan to assess cystic duct patency.     Cirrhosis and ascites were CBC auto differential [[id number]] (Abnormal) Collected: 11/13/17 1029   Updated: 11/13/17 1037    Specimen Source: Blood     WBC 7.7 k/uL    RBC 2.96 (L) m/uL    Hemoglobin 11.7 (L) g/dL    Hematocrit 33.1 (L) %    .9 (H) fL    MCH 39.4 (H) pg    MCHC 35.3 g/dL    RDW 15.5 (H) %    Platelets 87 (L) k/uL         Assessment and Plan   1. Abd Pain  2. Ascites  3. Severe hyperbilirubinemia  4. Cirrhosis likely Alcoholic hepatitis  5. Portal HTN  6. HIDA scan today  7. Then advance diet  8. Soft dental diet for now  9. Lactulose  10. Hyperammonemia worse  11. IV fluids low rate  12. HTN  13. PPI  14. IS  15. Hold Lovenox  16. Bleeding risk  17. Severe thrombocytopenia  18. Recheck labs  19. Resume essential meds  20. ?clonidine   21. Pt has h/o anxiety  22. Mickie Drain to go home  23. Mgmt pan d/w pt and nursing    There is no problem list on file for this patient.       Electronically signed by Maura Choudhury MD on 11/14/2017 at 7:43 AM

## 2017-11-14 NOTE — FLOWSHEET NOTE
Patient not in room; no family present.  prayed for patient; left note of support on tray table; left Advance Directive booklet on tray table. Spiritual Care will follow as needed.      11/14/17 1209   Encounter Summary   Services provided to: Patient not available   Referral/Consult From: Siomara Villaseñor Visiting (11/14/17)   Complexity of Encounter Low   Length of Encounter 15 minutes   Routine   Type Follow up   Assessment Unable to respond   Intervention Prayer  (Left note and Advance Directive booklet)   Outcome Did not respond

## 2017-11-14 NOTE — PLAN OF CARE
Problem: Pain:  Intervention: Opioid analgesia side-effects  Pt. Denies the need for any pain med at this time. Intervention: Assess barriers to pain control  No barriers. Intervention: Promote participation in pain management plan  Pt. Able to  Request pain meds as needed. Problem: Anxiety/Stress:  Intervention: Assess anxiety characteristics  Pt. Is tearful at times  Intervention: Assess coping skills and behavior  Pt. Has   that visits intermittently. Goal: Level of anxiety will decrease  Level of anxiety will decrease  Pt. Was informed of the news testing orders, and that food would be resumed after the HIDA scan today. Comments: Pt. Examined per Dr Jennifer Villafana early this am and orders were received. pt.  To receive IV potassium replacement for K+ of 23.7 this am.

## 2017-11-14 NOTE — PLAN OF CARE
Problem: Pain:  Goal: Control of acute pain  Control of acute pain   Outcome: Ongoing  Pain rating scale and available relief measures discussed with patient. She reports pain 8/10 in her lower abdomen. PRN Percocet given as ordered; patient reports pain 0/10 at reassessment. Continue to monitor at hourly rounds.

## 2017-11-15 ENCOUNTER — APPOINTMENT (OUTPATIENT)
Dept: INTERVENTIONAL RADIOLOGY/VASCULAR | Age: 62
DRG: 432 | End: 2017-11-15
Payer: COMMERCIAL

## 2017-11-15 ENCOUNTER — APPOINTMENT (OUTPATIENT)
Dept: ULTRASOUND IMAGING | Age: 62
DRG: 432 | End: 2017-11-15
Payer: COMMERCIAL

## 2017-11-15 LAB
ALBUMIN FLUID: <0.2 G/DL
ALBUMIN SERPL-MCNC: 2.9 G/DL (ref 3.5–5.2)
ALBUMIN/GLOBULIN RATIO: ABNORMAL (ref 1–2.5)
ALP BLD-CCNC: 190 U/L (ref 35–104)
ALPHA-1 ANTITRYPSIN: 188 MG/DL (ref 90–200)
ALT SERPL-CCNC: 70 U/L (ref 5–33)
AMMONIA: 74 UMOL/L (ref 11–51)
ANION GAP SERPL CALCULATED.3IONS-SCNC: 12 MMOL/L (ref 9–17)
ANTI-NUCLEAR ANTIBODY (ANA): ABNORMAL
AST SERPL-CCNC: 169 U/L
BILIRUB SERPL-MCNC: 12.67 MG/DL (ref 0.3–1.2)
BUN BLDV-MCNC: 11 MG/DL (ref 8–23)
BUN/CREAT BLD: 19 (ref 9–20)
CALCIUM SERPL-MCNC: 8.3 MG/DL (ref 8.6–10.4)
CHLORIDE BLD-SCNC: 97 MMOL/L (ref 98–107)
CO2: 26 MMOL/L (ref 20–31)
CREAT SERPL-MCNC: 0.58 MG/DL (ref 0.5–0.9)
DATE, STOOL #1: NORMAL
DATE, STOOL #2: NORMAL
DATE, STOOL #3: NORMAL
FERRITIN: 2168 UG/L (ref 13–150)
GFR AFRICAN AMERICAN: >60 ML/MIN
GFR NON-AFRICAN AMERICAN: >60 ML/MIN
GFR SERPL CREATININE-BSD FRML MDRD: ABNORMAL ML/MIN/{1.73_M2}
GFR SERPL CREATININE-BSD FRML MDRD: ABNORMAL ML/MIN/{1.73_M2}
GLUCOSE BLD-MCNC: 88 MG/DL (ref 70–99)
GLUCOSE, FLUID: 101 MG/DL
HAV IGM SER IA-ACNC: NONREACTIVE
HEMOCCULT SP1 STL QL: NEGATIVE
HEMOCCULT SP2 STL QL: NORMAL
HEMOCCULT SP3 STL QL: NORMAL
HEPATITIS B CORE IGM ANTIBODY: NONREACTIVE
HEPATITIS B SURFACE ANTIGEN: NONREACTIVE
HEPATITIS C ANTIBODY: NONREACTIVE
LACTATE DEHYDROGENASE: 554 U/L (ref 135–214)
POTASSIUM SERPL-SCNC: 3.6 MMOL/L (ref 3.7–5.3)
SODIUM BLD-SCNC: 135 MMOL/L (ref 135–144)
SPECIMEN TYPE: NORMAL
SPECIMEN TYPE: NORMAL
TIME, STOOL #1: 23
TIME, STOOL #2: NORMAL
TIME, STOOL #3: NORMAL
TOTAL PROTEIN: 6.4 G/DL (ref 6.4–8.3)

## 2017-11-15 PROCEDURE — 2580000003 HC RX 258: Performed by: FAMILY MEDICINE

## 2017-11-15 PROCEDURE — 82042 OTHER SOURCE ALBUMIN QUAN EA: CPT

## 2017-11-15 PROCEDURE — 80074 ACUTE HEPATITIS PANEL: CPT

## 2017-11-15 PROCEDURE — 0W9G3ZZ DRAINAGE OF PERITONEAL CAVITY, PERCUTANEOUS APPROACH: ICD-10-PCS | Performed by: RADIOLOGY

## 2017-11-15 PROCEDURE — 87186 SC STD MICRODIL/AGAR DIL: CPT

## 2017-11-15 PROCEDURE — 36415 COLL VENOUS BLD VENIPUNCTURE: CPT

## 2017-11-15 PROCEDURE — 88112 CYTOPATH CELL ENHANCE TECH: CPT

## 2017-11-15 PROCEDURE — 88305 TISSUE EXAM BY PATHOLOGIST: CPT

## 2017-11-15 PROCEDURE — 82945 GLUCOSE OTHER FLUID: CPT

## 2017-11-15 PROCEDURE — 99233 SBSQ HOSP IP/OBS HIGH 50: CPT | Performed by: INTERNAL MEDICINE

## 2017-11-15 PROCEDURE — C1729 CATH, DRAINAGE: HCPCS

## 2017-11-15 PROCEDURE — 49083 ABD PARACENTESIS W/IMAGING: CPT

## 2017-11-15 PROCEDURE — 87205 SMEAR GRAM STAIN: CPT

## 2017-11-15 PROCEDURE — 86038 ANTINUCLEAR ANTIBODIES: CPT

## 2017-11-15 PROCEDURE — 82272 OCCULT BLD FECES 1-3 TESTS: CPT

## 2017-11-15 PROCEDURE — C9113 INJ PANTOPRAZOLE SODIUM, VIA: HCPCS | Performed by: FAMILY MEDICINE

## 2017-11-15 PROCEDURE — 6370000000 HC RX 637 (ALT 250 FOR IP): Performed by: FAMILY MEDICINE

## 2017-11-15 PROCEDURE — 82728 ASSAY OF FERRITIN: CPT

## 2017-11-15 PROCEDURE — 86403 PARTICLE AGGLUT ANTBDY SCRN: CPT

## 2017-11-15 PROCEDURE — 6370000000 HC RX 637 (ALT 250 FOR IP): Performed by: INTERNAL MEDICINE

## 2017-11-15 PROCEDURE — 83615 LACTATE (LD) (LDH) ENZYME: CPT

## 2017-11-15 PROCEDURE — 87077 CULTURE AEROBIC IDENTIFY: CPT

## 2017-11-15 PROCEDURE — 80053 COMPREHEN METABOLIC PANEL: CPT

## 2017-11-15 PROCEDURE — 87070 CULTURE OTHR SPECIMN AEROBIC: CPT

## 2017-11-15 PROCEDURE — 1200000000 HC SEMI PRIVATE

## 2017-11-15 PROCEDURE — 82103 ALPHA-1-ANTITRYPSIN TOTAL: CPT

## 2017-11-15 PROCEDURE — 76705 ECHO EXAM OF ABDOMEN: CPT

## 2017-11-15 PROCEDURE — 82140 ASSAY OF AMMONIA: CPT

## 2017-11-15 PROCEDURE — 6360000002 HC RX W HCPCS: Performed by: FAMILY MEDICINE

## 2017-11-15 PROCEDURE — 87075 CULTR BACTERIA EXCEPT BLOOD: CPT

## 2017-11-15 RX ORDER — SPIRONOLACTONE 25 MG/1
50 TABLET ORAL DAILY
Status: DISCONTINUED | OUTPATIENT
Start: 2017-11-15 | End: 2017-11-16 | Stop reason: HOSPADM

## 2017-11-15 RX ORDER — PREDNISOLONE ACETATE 10 MG/ML
1 SUSPENSION/ DROPS OPHTHALMIC 2 TIMES DAILY
Status: DISCONTINUED | OUTPATIENT
Start: 2017-11-15 | End: 2017-11-16 | Stop reason: HOSPADM

## 2017-11-15 RX ORDER — FUROSEMIDE 20 MG/1
20 TABLET ORAL DAILY
Status: DISCONTINUED | OUTPATIENT
Start: 2017-11-15 | End: 2017-11-16 | Stop reason: HOSPADM

## 2017-11-15 RX ADMIN — LACTULOSE 10 G: 20 SOLUTION ORAL at 20:32

## 2017-11-15 RX ADMIN — OXYCODONE HYDROCHLORIDE AND ACETAMINOPHEN 1 TABLET: 5; 325 TABLET ORAL at 19:43

## 2017-11-15 RX ADMIN — SPIRONOLACTONE 50 MG: 25 TABLET, FILM COATED ORAL at 20:31

## 2017-11-15 RX ADMIN — PREDNISOLONE ACETATE 1 DROP: 10 SUSPENSION/ DROPS OPHTHALMIC at 20:32

## 2017-11-15 RX ADMIN — METOPROLOL SUCCINATE 25 MG: 25 TABLET, FILM COATED, EXTENDED RELEASE ORAL at 16:32

## 2017-11-15 RX ADMIN — OXYCODONE HYDROCHLORIDE AND ACETAMINOPHEN 1 TABLET: 5; 325 TABLET ORAL at 00:38

## 2017-11-15 RX ADMIN — Medication 10 ML: at 09:16

## 2017-11-15 RX ADMIN — LACTULOSE 10 G: 20 SOLUTION ORAL at 13:59

## 2017-11-15 RX ADMIN — FUROSEMIDE 20 MG: 20 TABLET ORAL at 20:31

## 2017-11-15 RX ADMIN — PANTOPRAZOLE SODIUM 40 MG: 40 INJECTION, POWDER, FOR SOLUTION INTRAVENOUS at 09:17

## 2017-11-15 ASSESSMENT — PAIN DESCRIPTION - PAIN TYPE
TYPE: ACUTE PAIN
TYPE: ACUTE PAIN
TYPE: ACUTE PAIN;CHRONIC PAIN
TYPE: ACUTE PAIN;CHRONIC PAIN
TYPE: ACUTE PAIN

## 2017-11-15 ASSESSMENT — PAIN DESCRIPTION - ORIENTATION
ORIENTATION: LOWER
ORIENTATION: LOWER;MID
ORIENTATION: LOWER

## 2017-11-15 ASSESSMENT — PAIN DESCRIPTION - FREQUENCY
FREQUENCY: INTERMITTENT
FREQUENCY: CONTINUOUS
FREQUENCY: INTERMITTENT
FREQUENCY: CONTINUOUS

## 2017-11-15 ASSESSMENT — PAIN DESCRIPTION - PROGRESSION
CLINICAL_PROGRESSION: GRADUALLY WORSENING
CLINICAL_PROGRESSION: NOT CHANGED
CLINICAL_PROGRESSION: GRADUALLY WORSENING
CLINICAL_PROGRESSION: GRADUALLY WORSENING

## 2017-11-15 ASSESSMENT — PAIN DESCRIPTION - DESCRIPTORS
DESCRIPTORS: CONSTANT;DULL;ACHING
DESCRIPTORS: ACHING;DISCOMFORT
DESCRIPTORS: ACHING
DESCRIPTORS: ACHING;DISCOMFORT
DESCRIPTORS: ACHING;DISCOMFORT

## 2017-11-15 ASSESSMENT — PAIN DESCRIPTION - LOCATION
LOCATION: ABDOMEN
LOCATION: BACK

## 2017-11-15 ASSESSMENT — PAIN SCALES - GENERAL
PAINLEVEL_OUTOF10: 7
PAINLEVEL_OUTOF10: 7
PAINLEVEL_OUTOF10: 0
PAINLEVEL_OUTOF10: 8
PAINLEVEL_OUTOF10: 5
PAINLEVEL_OUTOF10: 0
PAINLEVEL_OUTOF10: 5
PAINLEVEL_OUTOF10: 7
PAINLEVEL_OUTOF10: 2
PAINLEVEL_OUTOF10: 0
PAINLEVEL_OUTOF10: 7
PAINLEVEL_OUTOF10: 0

## 2017-11-15 ASSESSMENT — PAIN DESCRIPTION - ONSET
ONSET: ON-GOING
ONSET: GRADUAL
ONSET: ON-GOING
ONSET: ON-GOING

## 2017-11-15 NOTE — PROGRESS NOTES
Pt.   Has some slight redness to the right eye. Sclera red and she reports some tan drainage from the   Right eye.

## 2017-11-15 NOTE — BRIEF OP NOTE
Brief Postoperative Note    Alexandr Moe  YOB: 1955  [id number]    Pre-operative Diagnosis: ascites; abdominal discomfort    Post-operative Diagnosis: Same    Procedure: US guided paracentesis     Anesthesia: Local    Surgeons/Assistants: Rob    Estimated Blood Loss: less than 50     Complications: None    Specimens: Was Obtained: clear yellow fluid    Electronically signed by Mejia Powell MD on 11/15/2017 at 1:11 PM

## 2017-11-15 NOTE — PROGRESS NOTES
1415  Pt. Returned from IR after paracentesis was completed. There was  1175 ml fluid removed from the abd. A small folded 2x2 dressing is clean and dry under an opsite cover with no drainage or bleeding. Pt. To report any increase weakness, light headedness, or nausea or abd. Pain.

## 2017-11-15 NOTE — PROGRESS NOTES
Giovanny Elliott is a 57 y.o. female patient. Current Facility-Administered Medications   Medication Dose Route Frequency Provider Last Rate Last Dose    oxyCODONE-acetaminophen (PERCOCET) 5-325 MG per tablet 1 tablet  1 tablet Oral Q6H PRN Chelsea Graham MD   1 tablet at 11/15/17 0038    potassium chloride (KLOR-CON M) extended release tablet 40 mEq  40 mEq Oral PRN Chelsea Graham MD        Or    potassium chloride 20 MEQ/15ML (10%) oral solution 40 mEq  40 mEq Oral PRN Chelsea Graham MD        Or    potassium chloride 10 mEq/100 mL IVPB (Peripheral Line)  10 mEq Intravenous PRN Chelsea Graham MD   Stopped at 11/14/17 1120    potassium chloride 10 mEq/100 mL IVPB (Peripheral Line)  10 mEq Intravenous PRN Jennifer Montgomery MD   Stopped at 11/14/17 1932    pantoprazole (PROTONIX) injection 40 mg  40 mg Intravenous Daily Chelsea Graham MD   40 mg at 11/15/17 0917    And    sodium chloride (PF) 0.9 % injection 10 mL  10 mL Intravenous Daily Chelsea Graham MD   10 mL at 11/15/17 0916    metoprolol succinate (TOPROL XL) extended release tablet 25 mg  25 mg Oral Daily Chelsea Graham MD   25 mg at 11/14/17 1540    0.9 % sodium chloride infusion   Intravenous Continuous Chelsea Graham MD 75 mL/hr at 11/14/17 2352      sodium chloride flush 0.9 % injection 10 mL  10 mL Intravenous 2 times per day Jennifer Montgomery MD   10 mL at 11/14/17 0837    sodium chloride flush 0.9 % injection 10 mL  10 mL Intravenous PRN Chelsea Graham MD        ondansetron (ZOFRAN) injection 4 mg  4 mg Intravenous Q6H PRN Chelsea Graham MD        lactulose (CHRONULAC) 10 GM/15ML solution 10 g  10 g Oral TID Jennifer Montgomery MD   Stopped at 11/15/17 0926     Allergies   Allergen Reactions    Nuts [Peanut-Containing Drug Products] Anaphylaxis and Other (See Comments)     Pt states she can eat peanut butter, however some tree nuts and peanut products cause anaphylaxis.      Principal Problem:    Cirrhosis ([address]  Active Problems:    Hypokalemia Acute liver failure without hepatic coma    Generalized abdominal pain    Jaundice, non-    Portal hypertension (HCC)    Blood pressure 122/65, pulse 70, temperature 98.4 °F (36.9 °C), temperature source Oral, resp. rate 16, height 5' 4\" (1.626 m), weight 144 lb 1.6 oz (65.4 kg), SpO2 99 %. Subjective:  Symptoms:  Improved. She reports weakness and anorexia. No shortness of breath, cough, chest pain, headache, chest pressure or diarrhea. Diet:  Poor intake. Activity level: Returning to normal.    Pain:  She reports no pain. Objective:  General Appearance:  Comfortable, ill-appearing, in no acute distress and not in pain. Vital signs: (most recent): Blood pressure 122/65, pulse 70, temperature 98.4 °F (36.9 °C), temperature source Oral, resp. rate 16, height 5' 4\" (1.626 m), weight 144 lb 1.6 oz (65.4 kg), SpO2 99 %. Output: Producing urine and producing stool. HEENT: Normal HEENT exam.    Lungs:  Normal effort and normal respiratory rate. Breath sounds clear to auscultation. She is not in respiratory distress. No stridor. No rales or decreased breath sounds. Heart: Normal rate. Regular rhythm. S1 normal and S2 normal.    Chest: No chest wall tenderness. Abdomen: Abdomen is distended. There are signs of ascites. Hypoactive bowel sounds. There is no abdominal tenderness. There is no splenomegaly. There is no hepatomegaly. Extremities: There is no local swelling. Neurological: Patient is alert and oriented to person, place and time. Skin:  Warm.       Alpha-1-Antitrypsin [[id number]] Collected: 11/15/17 0650   Updated: 11/15/17 1034    Specimen Source: Blood     A-1 Antitrypsin 188 mg/dL    Comment: Performed at [address] [place] [address], [address] [place] [address] [phone]      Hepatitis Panel, Acute [[id number]] Collected: 11/15/17 0650   Updated: 11/15/17 0966    Specimen Source: Blood     Hepatitis B Surface Ag NONREACTIVE    Hepatitis C Ab NONREACTIVE at [address] [facility] Dada Jayson Llamas, [facility]    [phone]       ROMEO [[id number]] Collected: 11/15/17 0650   Updated: 11/15/17 0703    Specimen Source: Blood    Blood Occult Stool Diagnostic [[id number]] Collected: 11/15/17 0023   Updated: 11/15/17 0048    Specimen Source: Stool     Occult Blood, Stool #1 NEGATIVE    Date, Stool #1 11,152,017    Time, Stool #1 23    Comment: Performed at [address] [facility] Jayson FreitasFarhad, [facility]    [phone]        Occult Blood, Stool #2 NOT REPORTED    Date, Stool #2 NOT REPORTED    Time, Stool #2 NOT REPORTED    Occult Blood, Stool #3 NOT REPORTED    Date, Stool #3 NOT REPORTED    Time, Stool #3 NOT REPORTED   Basic Metabolic Prof [[id number]] (Abnormal) Collected: 11/14/17 1858   Updated: 11/14/17 1958    Specimen Source: Blood     Glucose 150 (H) mg/dL    BUN 13 mg/dL    CREATININE <0.40 (L) mg/dL    Comment: ICTERIC SPECIMEN       Bun/Cre Ratio CANNOT BE CALCULATED    Calcium 8.1 (L) mg/dL    Sodium 131 (L) mmol/L    Potassium 3.8 mmol/L    Chloride 92 (L) mmol/L    CO2 24 mmol/L    Anion Gap 15 mmol/L    GFR Non- CANNOT BE CALCULATED mL/min    GFR  CANNOT BE CALCULATED mL/min    GFR Comment         Comment: Average GFR for 61-76 years old:    85 mL/min/1.73sq m   Chronic Kidney Disease:    <60 mL/min/1.73sq m   Kidney failure:    <15 mL/min/1.73sq m               eGFR calculated using average adult body mass. Additional eGFR calculator    available at:         [email or web address]         Performed at [address] [facility] Al Farhad, [facility]    [phone]        GFR Staging NOT REPORTED   NM HEPATOBILIARY [[id number]] Collected: 11/14/17 1527   Updated: 11/14/17 1540    Narrative:     EXAMINATION:  NUCLEAR MEDICINE HEPATOBILIARY SCINTIGRAPHY (HIDA SCAN) WITH EJECTION  FRACTION.     TECHNIQUE:  Approximately 4.7 millicuries QN55H Mebrofenin (Choletec) was administered  IV.  Then, dynamic images of the abdomen were obtained in the anterior  projection for 60 mins.  A right lateral view was also obtained at 60 mins. Due to a shortage/inavailability of CCK, one can (237 ml) Ensure plus was  substitued orally.  Images were obtained in the YOLANDA projection and regions of  interest were drawn around the gallbladder and ejection fraction was  calculated. HISTORY:  ORDERING SYSTEM PROVIDED HISTORY: NAUSEA, VOMITING  TECHNOLOGIST PROVIDED HISTORY:  Elevated bilirubin, jaundice  Ordering Physician Provided Reason for Exam: Gallstones  Acuity: Unknown  Relevant Medical/Surgical History: Cirrhosis    FINDINGS:  Prompt, homogenous uptake by the liver is noted with normal appearance of  radiotracer excretion into the biliary system.  Clearance of bloodpool  activity appears appropriate. Gallbladder and small bowel is visualized in appropriate sequence and time. Gallbladder ejection fraction measured 10%. Normal value is >33% for Ensure protocol.  Note, Ensure normal range is based  on a limited study. Impression:     No convincing scintigraphic evidence of acute cholecystitis.  Decreased  gallbladder ejection fraction suggesting gallbladder dyskinesia. Cult,Fluid [[id number]] (Abnormal) Collected: 11/15/17 1528   Updated: 11/16/17 1110    Specimen Source: Ascitic Fluid     Specimen Description . ASCITIC FLUID Performed at [id number] [facility]    Specimen Description [place], [address] [facility] [phone]    Special Requests NOT REPORTED    Direct Exam FEW MONONUCLEAR WHITE BLOOD CELLS SEEN (A)    Direct Exam NO NEUTROPHILS SEEN    Direct Exam NO BACTERIA SEEN    Direct Exam Gram stain made from cytocentrifuged specimen.  Organisms and cells will be    Direct Exam  concentrated.     Culture NO GROWTH 15 HOURS    Culture Performed at [address] [phone]    Status Pending   CYTOLOGY, Comment         Comment: Average GFR for 61-76 years old:    85 mL/min/1.73sq m   Chronic Kidney Disease:    <60 mL/min/1.73sq m   Kidney failure:    <15 mL/min/1.73sq m               eGFR calculated using average adult body mass. Additional eGFR calculator    available at:         [email or web address]         Performed at [facility] [address], [address] [facility]    [phone]        GFR Staging NOT REPORTED   Ammonia [[id number]] (Abnormal) Collected: 11/16/17 0627   Updated: 11/16/17 0730    Specimen Source: Blood     Ammonia 81 (H) umol/L    Comment: Performed at [facility] [address], [address] [facility]    [phone]       ALBUMIN, FLUID [[id number]] Collected: 11/15/17 1527   Updated: 11/15/17 1955    Specimen Source: Paracentesis     Specimen Type . ASCITIC FLUID    Comment: Performed at [facility] [address], [address] [facility]    [phone]        Albumin, Fluid <0.2 g/dL    Comment: There are no normals for body fluid samples. Performed at [address] [phone]       GLUCOSE, BODY FLUID [[id number]] Collected: 11/15/17 1527   Updated: 11/15/17 1949    Specimen Source: Paracentesis     Specimen Type . ASCITIC FLUID    Comment: Performed at [facility] [address], [address] [facility]    [phone]        Glucose, Fluid 101 mg/dL    Comment: There are no normals for body fluid samples.    Performed at [address] [phone]       [address] [[id number]] Collected: 11/15/17 1350   Updated: 11/15/17 1532    Narrative:     PROCEDURE:  ULTRASOUND GUIDED PARACENTESIS    11/15/2017    HISTORY:  ORDERING SYSTEM PROVIDED HISTORY: ascites, jaundice  TECHNOLOGIST PROVIDED HISTORY:  Reason for exam:->ascites, jaundice  Reason for exam:->alcohol abuse    Abdominal

## 2017-11-16 VITALS
HEART RATE: 86 BPM | WEIGHT: 144.1 LBS | RESPIRATION RATE: 18 BRPM | HEIGHT: 64 IN | TEMPERATURE: 99 F | SYSTOLIC BLOOD PRESSURE: 124 MMHG | DIASTOLIC BLOOD PRESSURE: 66 MMHG | BODY MASS INDEX: 24.6 KG/M2 | OXYGEN SATURATION: 97 %

## 2017-11-16 LAB
ABSOLUTE EOS #: 0.06 K/UL (ref 0–0.4)
ABSOLUTE IMMATURE GRANULOCYTE: ABNORMAL K/UL (ref 0–0.3)
ABSOLUTE LYMPH #: 0.77 K/UL (ref 1–4.8)
ABSOLUTE MONO #: 0.24 K/UL (ref 0.2–0.8)
ALBUMIN SERPL-MCNC: 2.6 G/DL (ref 3.5–5.2)
ALBUMIN/GLOBULIN RATIO: ABNORMAL (ref 1–2.5)
ALP BLD-CCNC: 157 U/L (ref 35–104)
ALT SERPL-CCNC: 59 U/L (ref 5–33)
AMMONIA: 81 UMOL/L (ref 11–51)
ANION GAP SERPL CALCULATED.3IONS-SCNC: 13 MMOL/L (ref 9–17)
AST SERPL-CCNC: 145 U/L
BASOPHILS # BLD: 3 %
BASOPHILS ABSOLUTE: 0.18 K/UL (ref 0–0.2)
BILIRUB SERPL-MCNC: 11.72 MG/DL (ref 0.3–1.2)
BUN BLDV-MCNC: 9 MG/DL (ref 8–23)
BUN/CREAT BLD: 17 (ref 9–20)
CALCIUM SERPL-MCNC: 7.9 MG/DL (ref 8.6–10.4)
CASE NUMBER:: NORMAL
CHLORIDE BLD-SCNC: 98 MMOL/L (ref 98–107)
CO2: 25 MMOL/L (ref 20–31)
CREAT SERPL-MCNC: 0.53 MG/DL (ref 0.5–0.9)
DIFFERENTIAL TYPE: ABNORMAL
EOSINOPHILS RELATIVE PERCENT: 1 %
GFR AFRICAN AMERICAN: >60 ML/MIN
GFR NON-AFRICAN AMERICAN: >60 ML/MIN
GFR SERPL CREATININE-BSD FRML MDRD: ABNORMAL ML/MIN/{1.73_M2}
GFR SERPL CREATININE-BSD FRML MDRD: ABNORMAL ML/MIN/{1.73_M2}
GLUCOSE BLD-MCNC: 87 MG/DL (ref 70–99)
HCT VFR BLD CALC: 29.1 % (ref 36–46)
HEMOGLOBIN: 10.2 G/DL (ref 12–16)
IMMATURE GRANULOCYTES: ABNORMAL %
LYMPHOCYTES # BLD: 13 %
MCH RBC QN AUTO: 40.1 PG (ref 26–34)
MCHC RBC AUTO-ENTMCNC: 35 G/DL (ref 31–37)
MCV RBC AUTO: 114.7 FL (ref 80–100)
MONOCYTES # BLD: 4 %
MORPHOLOGY: ABNORMAL
PDW BLD-RTO: 16.6 % (ref 11.5–14.5)
PLATELET # BLD: 92 K/UL (ref 130–400)
PLATELET ESTIMATE: ABNORMAL
PMV BLD AUTO: 8.1 FL (ref 6–12)
POTASSIUM SERPL-SCNC: 3.4 MMOL/L (ref 3.7–5.3)
RBC # BLD: 2.54 M/UL (ref 4–5.2)
RBC # BLD: ABNORMAL 10*6/UL
SEG NEUTROPHILS: 79 %
SEGMENTED NEUTROPHILS ABSOLUTE COUNT: 4.65 K/UL (ref 1.8–7.7)
SODIUM BLD-SCNC: 136 MMOL/L (ref 135–144)
SPECIMEN DESCRIPTION: NORMAL
TOTAL PROTEIN: 5.7 G/DL (ref 6.4–8.3)
WBC # BLD: 5.9 K/UL (ref 3.5–11)
WBC # BLD: ABNORMAL 10*3/UL

## 2017-11-16 PROCEDURE — 36415 COLL VENOUS BLD VENIPUNCTURE: CPT

## 2017-11-16 PROCEDURE — 6360000002 HC RX W HCPCS: Performed by: FAMILY MEDICINE

## 2017-11-16 PROCEDURE — 82140 ASSAY OF AMMONIA: CPT

## 2017-11-16 PROCEDURE — C9113 INJ PANTOPRAZOLE SODIUM, VIA: HCPCS | Performed by: FAMILY MEDICINE

## 2017-11-16 PROCEDURE — 99233 SBSQ HOSP IP/OBS HIGH 50: CPT | Performed by: INTERNAL MEDICINE

## 2017-11-16 PROCEDURE — 80053 COMPREHEN METABOLIC PANEL: CPT

## 2017-11-16 PROCEDURE — 85025 COMPLETE CBC W/AUTO DIFF WBC: CPT

## 2017-11-16 PROCEDURE — 6370000000 HC RX 637 (ALT 250 FOR IP): Performed by: INTERNAL MEDICINE

## 2017-11-16 PROCEDURE — 6370000000 HC RX 637 (ALT 250 FOR IP): Performed by: FAMILY MEDICINE

## 2017-11-16 PROCEDURE — 2580000003 HC RX 258: Performed by: FAMILY MEDICINE

## 2017-11-16 RX ORDER — LACTULOSE 10 G/15ML
10 SOLUTION ORAL 3 TIMES DAILY
Qty: 1 BOTTLE | Refills: 1 | Status: ON HOLD | OUTPATIENT
Start: 2017-11-16 | End: 2017-12-18

## 2017-11-16 RX ORDER — PANTOPRAZOLE SODIUM 40 MG/1
40 TABLET, DELAYED RELEASE ORAL
Status: DISCONTINUED | OUTPATIENT
Start: 2017-11-17 | End: 2017-11-16 | Stop reason: HOSPADM

## 2017-11-16 RX ORDER — PREDNISOLONE ACETATE 10 MG/ML
1 SUSPENSION/ DROPS OPHTHALMIC 2 TIMES DAILY
Qty: 1 BOTTLE | Refills: 0 | Status: SHIPPED | OUTPATIENT
Start: 2017-11-16 | End: 2017-12-07

## 2017-11-16 RX ORDER — SULFAMETHOXAZOLE AND TRIMETHOPRIM 800; 160 MG/1; MG/1
1 TABLET ORAL DAILY
Qty: 30 TABLET | Refills: 0
Start: 2017-11-16 | End: 2017-12-07

## 2017-11-16 RX ORDER — SPIRONOLACTONE 50 MG/1
50 TABLET, FILM COATED ORAL DAILY
Qty: 30 TABLET | Refills: 3 | Status: ON HOLD | OUTPATIENT
Start: 2017-11-17 | End: 2017-12-15

## 2017-11-16 RX ORDER — FUROSEMIDE 20 MG/1
20 TABLET ORAL DAILY
Qty: 60 TABLET | Refills: 3 | Status: ON HOLD | OUTPATIENT
Start: 2017-11-17 | End: 2017-12-18 | Stop reason: HOSPADM

## 2017-11-16 RX ADMIN — METOPROLOL SUCCINATE 25 MG: 25 TABLET, FILM COATED, EXTENDED RELEASE ORAL at 15:50

## 2017-11-16 RX ADMIN — POTASSIUM CHLORIDE 40 MEQ: 20 TABLET, EXTENDED RELEASE ORAL at 10:29

## 2017-11-16 RX ADMIN — PREDNISOLONE ACETATE 1 DROP: 10 SUSPENSION/ DROPS OPHTHALMIC at 08:20

## 2017-11-16 RX ADMIN — LACTULOSE 10 G: 20 SOLUTION ORAL at 08:17

## 2017-11-16 RX ADMIN — SPIRONOLACTONE 50 MG: 25 TABLET, FILM COATED ORAL at 08:17

## 2017-11-16 RX ADMIN — PANTOPRAZOLE SODIUM 40 MG: 40 INJECTION, POWDER, FOR SOLUTION INTRAVENOUS at 08:17

## 2017-11-16 RX ADMIN — LACTULOSE 10 G: 20 SOLUTION ORAL at 15:50

## 2017-11-16 RX ADMIN — Medication 10 ML: at 08:18

## 2017-11-16 RX ADMIN — FUROSEMIDE 20 MG: 20 TABLET ORAL at 08:17

## 2017-11-16 RX ADMIN — SODIUM CHLORIDE: 9 INJECTION, SOLUTION INTRAVENOUS at 05:34

## 2017-11-16 ASSESSMENT — ENCOUNTER SYMPTOMS
SHORTNESS OF BREATH: 0
COUGH: 0
DIARRHEA: 0

## 2017-11-16 ASSESSMENT — PAIN SCALES - GENERAL: PAINLEVEL_OUTOF10: 0

## 2017-11-16 NOTE — PLAN OF CARE
Problem: Pain:  Goal: Control of acute pain  Control of acute pain   Outcome: Met This Shift  Patient denies abdominal pain this shift    Problem: Falls - Risk of:  Goal: Will remain free from falls  Will remain free from falls   Outcome: Met This Shift  Patient is a fall risk during this admission. Fall risk assessment was performed. Patient is absent of falls. Bed is in the lowest position. Wheels on the bed are locked. Call light and bed side table are within reach. Clutter is removed. Patient was educated to call out when needing assistance or wanting to get out of bed. Patient offered toileting assistance during rounding. Hourly rounds have been performed.

## 2017-11-16 NOTE — PROGRESS NOTES
GI Progress notes    11/16/2017   4:26 PM    Name:  Dev Zavaleta  MRN:    [id number]     Acct:     [de-identified]   Room:  2018/2018-02  IP Day: 3     Admit Date: 11/13/2017  9:47 AM  PCP: Tiki Peterson MD    Subjective:     C/C:   Chief Complaint   Patient presents with    Abdominal Pain       Interval History: Status: improved. Seen with her  in the room  Overall feels better  Has been discharged by Primary care  She is positive for c/s in her peritoneal fluid  She has mild abdominal discomfort  Has no overt bleeding or any melena  On lactulose     ROS:  Constitutional: negative for chills, fevers and sweats    Gastrointestinal: mild  abdominal pain, constipation,mild  diarrhea, nausea and vomiting      Medications: Allergies: Allergies   Allergen Reactions    Nuts [Peanut-Containing Drug Products] Anaphylaxis and Other (See Comments)     Pt states she can eat peanut butter, however some tree nuts and peanut products cause anaphylaxis. Current Meds:   [START ON 11/17/2017] pantoprazole (PROTONIX) tablet 40 mg QAM AC   prednisoLONE acetate (PRED FORTE) 1 % ophthalmic suspension 1 drop BID   furosemide (LASIX) tablet 20 mg Daily   spironolactone (ALDACTONE) tablet 50 mg Daily   oxyCODONE-acetaminophen (PERCOCET) 5-325 MG per tablet 1 tablet Q6H PRN   potassium chloride (KLOR-CON M) extended release tablet 40 mEq PRN   Or    potassium chloride 20 MEQ/15ML (10%) oral solution 40 mEq PRN   Or    potassium chloride 10 mEq/100 mL IVPB (Peripheral Line) PRN   potassium chloride 10 mEq/100 mL IVPB (Peripheral Line) PRN   metoprolol succinate (TOPROL XL) extended release tablet 25 mg Daily   0.9 % sodium chloride infusion Continuous   sodium chloride flush 0.9 % injection 10 mL 2 times per day   sodium chloride flush 0.9 % injection 10 mL PRN   ondansetron (ZOFRAN) injection 4 mg Q6H PRN   lactulose (CHRONULAC) 10 GM/15ML solution 10 g TID       Data:     Code Status:  Full Code    History reviewed.  No

## 2017-11-16 NOTE — PROGRESS NOTES
of education: N/A     Occupational History    Not on file. Social History Main Topics    Smoking status: Never Smoker    Smokeless tobacco: Never Used    Alcohol use 0.6 oz/week     1 Glasses of wine per week    Drug use: Unknown    Sexual activity: Not on file     Other Topics Concern    Not on file     Social History Narrative    No narrative on file       Vitals:  /64   Pulse 84   Temp 99.1 °F (37.3 °C)   Resp 18   Ht 5' 4\" (1.626 m)   Wt 144 lb 1.6 oz (65.4 kg)   SpO2 97%   BMI 24.73 kg/m²   Temp (24hrs), Av.6 °F (37 °C), Min:98.3 °F (36.8 °C), Max:99.1 °F (37.3 °C)    No results for input(s): POCGLU in the last 72 hours. I/O (24Hr):     Intake/Output Summary (Last 24 hours) at 11/15/17 1901  Last data filed at 11/15/17 1259   Gross per 24 hour   Intake          2153.74 ml   Output             2800 ml   Net          -646.26 ml       Labs:      CBC: Lab Results   Component Value Date    WBC 6.8 2017    RBC 2.55 2017    HGB 9.9 2017    HCT 28.7 2017    .7 2017    MCH 38.8 2017    MCHC 34.4 2017    RDW 15.6 2017    PLT 88 2017    MPV 8.2 2017     CBC with Differential:  Lab Results   Component Value Date    WBC 6.8 2017    RBC 2.55 2017    HGB 9.9 2017    HCT 28.7 2017    PLT 88 2017    .7 2017    MCH 38.8 2017    MCHC 34.4 2017    RDW 15.6 2017    LYMPHOPCT 13 2017    MONOPCT 9 2017    BASOPCT 0 2017    MONOSABS 0.70 2017    LYMPHSABS 1.00 2017    EOSABS 0.10 2017    BASOSABS 0.00 2017    DIFFTYPE NOT REPORTED 2017     Hemoglobin/Hematocrit:  Lab Results   Component Value Date    HGB 9.9 2017    HCT 28.7 2017     CMP:  Lab Results   Component Value Date     11/15/2017    K 3.6 11/15/2017    CL 97 11/15/2017    CO2 26 11/15/2017    BUN 11 11/15/2017    CREATININE 0.58 11/15/2017    GFRAA >60

## 2017-11-16 NOTE — PLAN OF CARE
Problem: Pain:  Goal: Pain level will decrease  Pain level will decrease   Outcome: Ongoing    Goal: Control of acute pain  Control of acute pain   Outcome: Ongoing  Pain level assessment complete. Pt educated on pain scale and control interventions. PRN pain medication given per pt request.   Pt instructed to call out with new onset of pain or unrelieved pain. Problem: Anxiety/Stress:  Goal: Level of anxiety will decrease  Level of anxiety will decrease   Outcome: Ongoing      Problem: Falls - Risk of:  Goal: Will remain free from falls  Will remain free from falls   Outcome: Ongoing  Siderails up x 2  Hourly rounding. Call light in reach. Instructed to call for assist before attempting out of bed. Remains free from falls and accidental injury at this time. Floor free from obstacles, and bed is locked and in lowest position. Adequate lighting provided. Patient ambulating with steady gait.

## 2017-11-17 ENCOUNTER — HOSPITAL ENCOUNTER (EMERGENCY)
Age: 62
Discharge: HOME | End: 2017-11-17
Payer: COMMERCIAL

## 2017-11-17 ENCOUNTER — APPOINTMENT (OUTPATIENT)
Dept: INTERVENTIONAL RADIOLOGY/VASCULAR | Age: 62
End: 2017-11-17
Payer: COMMERCIAL

## 2017-11-17 ENCOUNTER — APPOINTMENT (OUTPATIENT)
Dept: GENERAL RADIOLOGY | Age: 62
End: 2017-11-17
Payer: COMMERCIAL

## 2017-11-17 VITALS
RESPIRATION RATE: 16 BRPM | DIASTOLIC BLOOD PRESSURE: 73 MMHG | SYSTOLIC BLOOD PRESSURE: 133 MMHG | OXYGEN SATURATION: 99 % | WEIGHT: 135 LBS | TEMPERATURE: 99.5 F | BODY MASS INDEX: 24.84 KG/M2 | HEART RATE: 106 BPM | HEIGHT: 62 IN

## 2017-11-17 DIAGNOSIS — R18.8 OTHER ASCITES: Primary | ICD-10-CM

## 2017-11-17 LAB
ABSOLUTE EOS #: 0.1 K/UL (ref 0–0.4)
ABSOLUTE IMMATURE GRANULOCYTE: ABNORMAL K/UL (ref 0–0.3)
ABSOLUTE LYMPH #: 0.8 K/UL (ref 1–4.8)
ABSOLUTE MONO #: 0.9 K/UL (ref 0.2–0.8)
ALBUMIN SERPL-MCNC: 3 G/DL (ref 3.5–5.2)
ALBUMIN/GLOBULIN RATIO: ABNORMAL (ref 1–2.5)
ALP BLD-CCNC: 187 U/L (ref 35–104)
ALT SERPL-CCNC: 68 U/L (ref 5–33)
ANION GAP SERPL CALCULATED.3IONS-SCNC: 15 MMOL/L
AST SERPL-CCNC: 161 U/L
BASOPHILS # BLD: 0 %
BASOPHILS ABSOLUTE: 0 K/UL (ref 0–0.2)
BILIRUB SERPL-MCNC: 14.66 MG/DL (ref 0.3–1.2)
BUN BLDV-MCNC: 8 MG/DL (ref 8–23)
BUN/CREAT BLD: ABNORMAL (ref 9–20)
CALCIUM SERPL-MCNC: 8.9 MG/DL (ref 8.6–10.4)
CHLORIDE BLD-SCNC: 98 MMOL/L (ref 98–107)
CO2: 22 MMOL/L (ref 20–31)
CREAT SERPL-MCNC: <0.4 MG/DL (ref 0.5–0.9)
DIFFERENTIAL TYPE: ABNORMAL
EOSINOPHILS RELATIVE PERCENT: 1 %
GFR AFRICAN AMERICAN: ABNORMAL ML/MIN
GFR NON-AFRICAN AMERICAN: ABNORMAL ML/MIN
GFR SERPL CREATININE-BSD FRML MDRD: ABNORMAL ML/MIN/{1.73_M2}
GFR SERPL CREATININE-BSD FRML MDRD: ABNORMAL ML/MIN/{1.73_M2}
GLUCOSE BLD-MCNC: 139 MG/DL (ref 70–99)
HCT VFR BLD CALC: 35.5 % (ref 36–46)
HEMOGLOBIN: 12.1 G/DL (ref 12–16)
IMMATURE GRANULOCYTES: ABNORMAL %
INR BLD: 1.7
LYMPHOCYTES # BLD: 11 %
MCH RBC QN AUTO: 39.8 PG (ref 26–34)
MCHC RBC AUTO-ENTMCNC: 34.2 G/DL (ref 31–37)
MCV RBC AUTO: 116.4 FL (ref 80–100)
MONOCYTES # BLD: 11 %
PARTIAL THROMBOPLASTIN TIME: 29.9 SEC (ref 23–31)
PDW BLD-RTO: 17.1 % (ref 11.5–14.5)
PLATELET # BLD: 167 K/UL (ref 130–400)
PLATELET ESTIMATE: ABNORMAL
PMV BLD AUTO: 8.9 FL (ref 6–12)
POTASSIUM SERPL-SCNC: 4 MMOL/L (ref 3.7–5.3)
PROTHROMBIN TIME: 17.5 SEC (ref 9.7–11.6)
RBC # BLD: 3.05 M/UL (ref 4–5.2)
RBC # BLD: ABNORMAL 10*6/UL
SEG NEUTROPHILS: 77 %
SEGMENTED NEUTROPHILS ABSOLUTE COUNT: 6.1 K/UL (ref 1.8–7.7)
SODIUM BLD-SCNC: 135 MMOL/L (ref 135–144)
SURGICAL PATHOLOGY REPORT: NORMAL
TOTAL PROTEIN: 7.1 G/DL (ref 6.4–8.3)
WBC # BLD: 7.9 K/UL (ref 3.5–11)
WBC # BLD: ABNORMAL 10*3/UL

## 2017-11-17 PROCEDURE — 99285 EMERGENCY DEPT VISIT HI MDM: CPT

## 2017-11-17 PROCEDURE — 6360000002 HC RX W HCPCS: Performed by: PHYSICIAN ASSISTANT

## 2017-11-17 PROCEDURE — 96374 THER/PROPH/DIAG INJ IV PUSH: CPT

## 2017-11-17 PROCEDURE — 96375 TX/PRO/DX INJ NEW DRUG ADDON: CPT

## 2017-11-17 PROCEDURE — 89051 BODY FLUID CELL COUNT: CPT

## 2017-11-17 PROCEDURE — 36415 COLL VENOUS BLD VENIPUNCTURE: CPT

## 2017-11-17 PROCEDURE — C1729 CATH, DRAINAGE: HCPCS

## 2017-11-17 PROCEDURE — 80053 COMPREHEN METABOLIC PANEL: CPT

## 2017-11-17 PROCEDURE — 87075 CULTR BACTERIA EXCEPT BLOOD: CPT

## 2017-11-17 PROCEDURE — 85730 THROMBOPLASTIN TIME PARTIAL: CPT

## 2017-11-17 PROCEDURE — 85025 COMPLETE CBC W/AUTO DIFF WBC: CPT

## 2017-11-17 PROCEDURE — 49083 ABD PARACENTESIS W/IMAGING: CPT

## 2017-11-17 PROCEDURE — 87070 CULTURE OTHR SPECIMN AEROBIC: CPT

## 2017-11-17 PROCEDURE — 85610 PROTHROMBIN TIME: CPT

## 2017-11-17 PROCEDURE — 71010 XR CHEST PORTABLE: CPT

## 2017-11-17 PROCEDURE — 87205 SMEAR GRAM STAIN: CPT

## 2017-11-17 RX ORDER — MORPHINE SULFATE 4 MG/ML
4 INJECTION, SOLUTION INTRAMUSCULAR; INTRAVENOUS ONCE
Status: COMPLETED | OUTPATIENT
Start: 2017-11-17 | End: 2017-11-17

## 2017-11-17 RX ORDER — ONDANSETRON 2 MG/ML
4 INJECTION INTRAMUSCULAR; INTRAVENOUS ONCE
Status: COMPLETED | OUTPATIENT
Start: 2017-11-17 | End: 2017-11-17

## 2017-11-17 RX ADMIN — ONDANSETRON 4 MG: 2 INJECTION INTRAMUSCULAR; INTRAVENOUS at 16:22

## 2017-11-17 RX ADMIN — MORPHINE SULFATE 4 MG: 4 INJECTION, SOLUTION INTRAMUSCULAR; INTRAVENOUS at 16:22

## 2017-11-17 ASSESSMENT — PAIN DESCRIPTION - DESCRIPTORS: DESCRIPTORS: PRESSURE;SHARP

## 2017-11-17 ASSESSMENT — PAIN DESCRIPTION - FREQUENCY: FREQUENCY: CONTINUOUS

## 2017-11-17 ASSESSMENT — PAIN DESCRIPTION - LOCATION: LOCATION: BACK

## 2017-11-17 ASSESSMENT — PAIN SCALES - GENERAL: PAINLEVEL_OUTOF10: 8

## 2017-11-17 ASSESSMENT — PAIN SCALES - WONG BAKER: WONGBAKER_NUMERICALRESPONSE: 8

## 2017-11-17 NOTE — ED PROVIDER NOTES
mg by mouth dailyHistorical Med             PAST MEDICAL HISTORY         Diagnosis Date    Hypertension     [address] ([facility] [address]        SURGICAL HISTORY     History reviewed. No pertinent surgical history. FAMILY HISTORY     History reviewed. No pertinent family history. No family status information on file. SOCIAL HISTORY      reports that she has never smoked. She has never used smokeless tobacco. She reports that she does not drink alcohol or use drugs. REVIEW OF SYSTEMS    (2-9 systems for level 4, 10 or more for level 5)   Review of Systems    Except as noted above the remainder of the review of systems was reviewed and negative. PHYSICAL EXAM    (up to 7 for level 4, 8 or more for level 5)     ED Triage Vitals [11/17/17 1531]   BP Temp Temp Source Pulse Resp SpO2 Height Weight   133/73 99.5 °F (37.5 °C) Oral 106 16 99 % 5' 2\" (1.575 m) 135 lb (61.2 kg)     Physical Exam   Constitutional: She is oriented to person, place, and time. She appears well-developed. HENT:   Head: Normocephalic and atraumatic. Neck: Normal range of motion. Neck supple. Cardiovascular: Normal rate and regular rhythm. Pulmonary/Chest: Effort normal and breath sounds normal.   Abdominal: Soft. She exhibits ascites. Musculoskeletal: Normal range of motion. Neurological: She is alert and oriented to person, place, and time. Skin: Skin is warm. No rash noted. Psychiatric: She has a normal mood and affect.  Her behavior is normal.               DIAGNOSTIC RESULTS     EKG: All EKG's are interpreted by the Emergency Department Physician who either signs or Co-signs this chart in the absence of a cardiologist.        RADIOLOGY:   Non-plain film images such as CT, Ultrasound and MRI are read by the radiologist. Plain radiographic images are visualized and preliminarily interpreted by the emergency physician with the below findings:        Interpretation per the Radiologist below, if available at the time of Efforts were made to edit the dictations but occasionally words are mis-transcribed.)    Sherryle Flakes, PA-C Sherryle Flakes, PA-C  11/18/17 0359

## 2017-11-17 NOTE — BRIEF OP NOTE
Brief Postoperative Note    Lady Suarez  YOB: 1955  [id number]    Pre-operative Diagnosis: Recurrent ascites; abdominal discomfort    Post-operative Diagnosis: Same    Procedure: US guided paracentesis     Anesthesia: Local    Surgeons/Assistants: Rob    Estimated Blood Loss: less than 50     Complications: None    Specimens: Was Obtained: zain clear fluid    Electronically signed by Yinka To MD on 11/17/2017 at 4:58 PM

## 2017-11-18 LAB
CULTURE: ABNORMAL
DIRECT EXAM: ABNORMAL
Lab: ABNORMAL
ORGANISM: ABNORMAL
SPECIMEN DESCRIPTION: ABNORMAL
SPECIMEN DESCRIPTION: ABNORMAL
STATUS: ABNORMAL

## 2017-11-21 LAB
APPEARANCE FLUID: NORMAL
BASO FLUID: NORMAL %
COLOR FLUID: NORMAL
EOSINOPHIL FLUID: NORMAL %
FLUID DIFF COMMENT: NORMAL
LYMPHOCYTES, BODY FLUID: 12 %
MONOCYTE, FLUID: NORMAL %
NEUTROPHIL, FLUID: 28 %
OTHER CELLS FLUID: NORMAL %
RBC FLUID: <3000 /MM3
SPECIMEN TYPE: NORMAL
WBC FLUID: 54 /MM3

## 2017-11-24 LAB
CULTURE: NORMAL
CULTURE: NORMAL
DIRECT EXAM: NORMAL
Lab: NORMAL
SPECIMEN DESCRIPTION: NORMAL
SPECIMEN DESCRIPTION: NORMAL
STATUS: NORMAL

## 2017-11-27 NOTE — DISCHARGE SUMMARY
Patient ID: Giovanny Elliott    MRN: [id number]     Acct:  [de-identified]       Patient's PCP: Jennifer Montgomery MD    Admit Date: 2017     Discharge Date: 2017      Admitting Physician: Jennifer Montgomery MD    Discharge Physician: Jennifer Montgomery MD     Discharge Diagnoses: Alcoholic hepatitis with cirrhosis and ascites    Primary Problem  Cirrhosis [facility])    Active Hospital Problems    Diagnosis Date Noted    Alcoholic cirrhosis of liver without ascites ([facility] [address] [K70.30]     Cirrhosis ([facility] [address] [K74.60] 2017    Hypokalemia [E87.6] 2017    Acute liver failure without hepatic coma [K72.00]     Generalized abdominal pain [R10.84]     Jaundice, non- [R17]     Portal hypertension ([facility] [address] [K76.6]      Past Medical History:   Diagnosis Date    Hypertension     Liver cirrhosis ([facility] [address]      The patient was seen and examined on day of discharge and this discharge summary is in conjunction with any daily progress note from day of discharge. Code Status:  Prior    Hospital Course: uneventful    Consults:  GI and IR    Significant Diagnostic Studies: as above, and as follows: see EMR    Treatments: as above    Disposition: home    Discharged Condition: Stable    Follow Up:  Jennifer Montgomery MD in one week    Discharge Medications:     Radha Buys   Home Medication Instructions [id number]    Printed on:17   Medication Information                      furosemide (LASIX) 20 MG tablet  Take 1 tablet by mouth daily             lactulose (CHRONULAC) 10 GM/15ML solution  Take 15 mLs by mouth 3 times daily             metoprolol succinate (TOPROL XL) 25 MG extended release tablet  Take 25 mg by mouth daily             Multiple Vitamins-Minerals (WOMENS MULTIVITAMIN PO)  Take 1 tablet by mouth daily             prednisoLONE acetate (PRED FORTE) 1 % ophthalmic suspension  Place 1 drop into the right eye 2 times daily             spironolactone (ALDACTONE) 50 MG tablet  Take 1 tablet by mouth daily sulfamethoxazole-trimethoprim (BACTRIM DS) 800-160 MG per tablet  Take 1 tablet by mouth daily                  Activity: activity as tolerated    Diet: low protein    Time Spent on discharge is more than 20 minutes in the examination, evaluation, counseling and review of medications and discharge plan. Electronically signed by Saúl Keys MD on 11/26/2017 at 9:23 PM     Thank you Dr. Saúl Keys MD for the opportunity to be involved in this patient's care.

## 2017-11-30 ENCOUNTER — HOSPITAL ENCOUNTER (OUTPATIENT)
Dept: ULTRASOUND IMAGING | Age: 62
Discharge: HOME | End: 2017-11-30
Payer: COMMERCIAL

## 2017-11-30 VITALS — SYSTOLIC BLOOD PRESSURE: 129 MMHG | DIASTOLIC BLOOD PRESSURE: 49 MMHG

## 2017-11-30 DIAGNOSIS — R18.8 OTHER ASCITES: ICD-10-CM

## 2017-11-30 PROCEDURE — 87205 SMEAR GRAM STAIN: CPT

## 2017-11-30 PROCEDURE — 87186 SC STD MICRODIL/AGAR DIL: CPT

## 2017-11-30 PROCEDURE — 87076 CULTURE ANAEROBE IDENT EACH: CPT

## 2017-11-30 PROCEDURE — 87185 SC STD ENZYME DETCJ PER NZM: CPT

## 2017-11-30 PROCEDURE — 87070 CULTURE OTHR SPECIMN AEROBIC: CPT

## 2017-11-30 PROCEDURE — 87077 CULTURE AEROBIC IDENTIFY: CPT

## 2017-11-30 PROCEDURE — 87075 CULTR BACTERIA EXCEPT BLOOD: CPT

## 2017-11-30 PROCEDURE — 89051 BODY FLUID CELL COUNT: CPT

## 2017-11-30 PROCEDURE — 49083 ABD PARACENTESIS W/IMAGING: CPT

## 2017-12-01 LAB
APPEARANCE FLUID: NORMAL
BASO FLUID: NORMAL %
COLOR FLUID: NORMAL
EOSINOPHIL FLUID: NORMAL %
FLUID DIFF COMMENT: NORMAL
LYMPHOCYTES, BODY FLUID: 10 %
MONOCYTE, FLUID: NORMAL %
NEUTROPHIL, FLUID: 3 %
OTHER CELLS FLUID: NORMAL %
RBC FLUID: <3000 /MM3
SPECIMEN TYPE: NORMAL
WBC FLUID: 41 /MM3

## 2017-12-07 ENCOUNTER — APPOINTMENT (OUTPATIENT)
Dept: INTERVENTIONAL RADIOLOGY/VASCULAR | Age: 62
End: 2017-12-07
Payer: COMMERCIAL

## 2017-12-07 ENCOUNTER — HOSPITAL ENCOUNTER (EMERGENCY)
Age: 62
Discharge: HOME | End: 2017-12-07
Attending: EMERGENCY MEDICINE
Payer: COMMERCIAL

## 2017-12-07 VITALS
DIASTOLIC BLOOD PRESSURE: 59 MMHG | WEIGHT: 130 LBS | HEIGHT: 63 IN | SYSTOLIC BLOOD PRESSURE: 109 MMHG | OXYGEN SATURATION: 94 % | TEMPERATURE: 99.8 F | RESPIRATION RATE: 16 BRPM | HEART RATE: 89 BPM | BODY MASS INDEX: 23.04 KG/M2

## 2017-12-07 DIAGNOSIS — R18.8 OTHER ASCITES: Primary | ICD-10-CM

## 2017-12-07 LAB
ABSOLUTE EOS #: 0.1 K/UL (ref 0–0.4)
ABSOLUTE IMMATURE GRANULOCYTE: ABNORMAL K/UL (ref 0–0.3)
ABSOLUTE LYMPH #: 0.6 K/UL (ref 1–4.8)
ABSOLUTE MONO #: 0.7 K/UL (ref 0.2–0.8)
ALBUMIN SERPL-MCNC: 3 G/DL (ref 3.5–5.2)
ALBUMIN/GLOBULIN RATIO: ABNORMAL (ref 1–2.5)
ALP BLD-CCNC: 155 U/L (ref 35–104)
ALT SERPL-CCNC: 76 U/L (ref 5–33)
AMYLASE: 25 U/L (ref 28–100)
AST SERPL-CCNC: 232 U/L
BASOPHILS # BLD: 0 % (ref 0–2)
BASOPHILS ABSOLUTE: 0 K/UL (ref 0–0.2)
BILIRUB SERPL-MCNC: 14.81 MG/DL (ref 0.3–1.2)
BILIRUBIN DIRECT: 10.72 MG/DL
BILIRUBIN, INDIRECT: 4.09 MG/DL (ref 0–1)
BNP INTERPRETATION: ABNORMAL
CULTURE: ABNORMAL
DIFFERENTIAL TYPE: ABNORMAL
DIRECT EXAM: ABNORMAL
EOSINOPHILS RELATIVE PERCENT: 1 % (ref 1–4)
GLOBULIN: ABNORMAL G/DL (ref 1.5–3.8)
HCT VFR BLD CALC: 35.7 % (ref 36–46)
HEMOGLOBIN: 12.1 G/DL (ref 12–16)
IMMATURE GRANULOCYTES: ABNORMAL %
INR BLD: 1.6
LIPASE: 29 U/L (ref 13–60)
LYMPHOCYTES # BLD: 5 % (ref 24–44)
Lab: ABNORMAL
MCH RBC QN AUTO: 41.4 PG (ref 26–34)
MCHC RBC AUTO-ENTMCNC: 33.9 G/DL (ref 31–37)
MCV RBC AUTO: 121.9 FL (ref 80–100)
MONOCYTES # BLD: 7 % (ref 1–7)
ORGANISM: ABNORMAL
PARTIAL THROMBOPLASTIN TIME: 34.9 SEC (ref 23–31)
PDW BLD-RTO: 15.7 % (ref 11.5–14.5)
PLATELET # BLD: 172 K/UL (ref 130–400)
PLATELET ESTIMATE: ABNORMAL
PMV BLD AUTO: 8.4 FL (ref 6–12)
PRO-BNP: 342 PG/ML
PROTHROMBIN TIME: 16.5 SEC (ref 9.7–11.6)
RBC # BLD: 2.92 M/UL (ref 4–5.2)
RBC # BLD: ABNORMAL 10*6/UL
SEG NEUTROPHILS: 87 % (ref 36–66)
SEGMENTED NEUTROPHILS ABSOLUTE COUNT: 9.4 K/UL (ref 1.8–7.7)
SPECIMEN DESCRIPTION: ABNORMAL
STATUS: ABNORMAL
TOTAL PROTEIN: 7 G/DL (ref 6.4–8.3)
WBC # BLD: 10.7 K/UL (ref 3.5–11)
WBC # BLD: ABNORMAL 10*3/UL

## 2017-12-07 PROCEDURE — 85610 PROTHROMBIN TIME: CPT

## 2017-12-07 PROCEDURE — 83880 ASSAY OF NATRIURETIC PEPTIDE: CPT

## 2017-12-07 PROCEDURE — 85025 COMPLETE CBC W/AUTO DIFF WBC: CPT

## 2017-12-07 PROCEDURE — 99283 EMERGENCY DEPT VISIT LOW MDM: CPT

## 2017-12-07 PROCEDURE — 82150 ASSAY OF AMYLASE: CPT

## 2017-12-07 PROCEDURE — 80076 HEPATIC FUNCTION PANEL: CPT

## 2017-12-07 PROCEDURE — 49083 ABD PARACENTESIS W/IMAGING: CPT

## 2017-12-07 PROCEDURE — C1729 CATH, DRAINAGE: HCPCS

## 2017-12-07 PROCEDURE — 83690 ASSAY OF LIPASE: CPT

## 2017-12-07 PROCEDURE — 85730 THROMBOPLASTIN TIME PARTIAL: CPT

## 2017-12-07 PROCEDURE — 49082 ABD PARACENTESIS: CPT

## 2017-12-07 RX ORDER — SULFAMETHOXAZOLE AND TRIMETHOPRIM 800; 160 MG/1; MG/1
1 TABLET ORAL DAILY
Status: ON HOLD | COMMUNITY
End: 2017-12-18 | Stop reason: HOSPADM

## 2017-12-07 ASSESSMENT — ENCOUNTER SYMPTOMS
DIARRHEA: 0
SHORTNESS OF BREATH: 0
VOMITING: 0
FACIAL SWELLING: 0
ABDOMINAL PAIN: 0
COUGH: 0
COLOR CHANGE: 0
CONSTIPATION: 0
ABDOMINAL DISTENTION: 1
EYE DISCHARGE: 0
EYE REDNESS: 0

## 2017-12-07 ASSESSMENT — PAIN SCALES - GENERAL: PAINLEVEL_OUTOF10: 2

## 2017-12-07 NOTE — ED PROVIDER NOTES
[facility] ED  eMERGENCY dEPARTMENT eNCOUnter      Pt Name: Qing Pollack  MRN: [id number]  [place] 1955  Date of evaluation: 12/7/2017  Provider: Reyes Tolliver MD    CHIEF COMPLAINT       Chief Complaint   Patient presents with    Fatigue     no appetite         HISTORY OF PRESENT ILLNESS  (Location/Symptom, Timing/Onset, Context/Setting, Quality, Duration, Modifying Factors, Severity.)   Qing Pollack is a 60 y.o. female who presents to the emergency department For abdominal distention. She has not had a fever or pain. She has been fatigued and her appetite has been poor but these  are ongoing issues for her. She has a history of ascites and 1 week ago today had paracentesis. She has had reaccumulation of fluid over the last few days. She is taking an antibiotic prescribed by her doctor. Symptom is continuous. No vomiting or fever. Nursing Notes were reviewed. ALLERGIES     Nuts [peanut-containing drug products]    CURRENT MEDICATIONS       Previous Medications    FUROSEMIDE (LASIX) 20 MG TABLET    Take 1 tablet by mouth daily    LACTULOSE (CHRONULAC) 10 GM/15ML SOLUTION    Take 15 mLs by mouth 3 times daily    METOPROLOL SUCCINATE (TOPROL XL) 25 MG EXTENDED RELEASE TABLET    Take 25 mg by mouth daily    SPIRONOLACTONE (ALDACTONE) 50 MG TABLET    Take 1 tablet by mouth daily    SULFAMETHOXAZOLE-TRIMETHOPRIM (BACTRIM DS;SEPTRA DS) 800-160 MG PER TABLET    Take 1 tablet by mouth daily       PAST MEDICAL HISTORY         Diagnosis Date    Hypertension     Liver cirrhosis ([facility] [address]        SURGICAL HISTORY     History reviewed. No pertinent surgical history. FAMILY HISTORY     History reviewed. No pertinent family history. No family status information on file. SOCIAL HISTORY      reports that she has never smoked. She has never used smokeless tobacco. She reports that she does not drink alcohol or use drugs.     REVIEW OF SYSTEMS    (2-9 systems for level 4, 10 or more for level 5) Review of Systems   Constitutional: Negative for chills, fatigue and fever. HENT: Negative for congestion, ear discharge and facial swelling. Eyes: Negative for discharge and redness. Respiratory: Negative for cough and shortness of breath. Cardiovascular: Negative for chest pain. Gastrointestinal: Positive for abdominal distention. Negative for abdominal pain, constipation, diarrhea and vomiting. Genitourinary: Negative for dysuria and hematuria. Musculoskeletal: Negative for arthralgias. Skin: Negative for color change and rash. Neurological: Negative for syncope, numbness and headaches. Hematological: Negative for adenopathy. Psychiatric/Behavioral: Negative for confusion. The patient is not nervous/anxious. Except as noted above the remainder of the review of systems was reviewed and negative. PHYSICAL EXAM    (up to 7 for level 4, 8 or more for level 5)     Vitals:    12/07/17 0821 12/07/17 0906 12/07/17 0921 12/07/17 1025   BP: (!) 141/60 127/66 130/61 134/66   Pulse: 110 91 87 96   Resp: 18 21 19 16   Temp: 99.8 °F (37.7 °C)      TempSrc: Oral      SpO2: 95% 100% 99%    Weight: 130 lb (59 kg)      Height: 5' 3\" (1.6 m)          Physical Exam   Constitutional: She is oriented to person, place, and time. She appears well-developed and well-nourished. No distress. HENT:   Head: Normocephalic and atraumatic. Eyes: Right eye exhibits no discharge. Left eye exhibits no discharge. No scleral icterus. Neck: Neck supple. Cardiovascular: Normal rate and regular rhythm. Pulmonary/Chest: Effort normal and breath sounds normal. No stridor. No respiratory distress. She has no wheezes. She has no rales. Abdominal: Soft. She exhibits distension. There is no tenderness. She has positive fluid wave. She has mild caput medusa. Musculoskeletal: Normal range of motion. Lymphadenopathy:     She has no cervical adenopathy.    Neurological: She is alert and oriented to

## 2017-12-07 NOTE — PROGRESS NOTES
Pt tolerated procedure without distress. 4 liters of clear yellow ascitic fluid removed Dressing applied to procedure site.  Pt returned to ed in nad

## 2017-12-07 NOTE — ED NOTES
Back from IR.  4 liters  Removed. patient states feels much better. Dr Sue Perez talks with patient.      Salma Holt, PALAK  12/07/17 3926

## 2017-12-12 LAB
MICROBIOLOGY SEND OUT REPORT: NORMAL
TEST NAME: NORMAL

## 2017-12-15 ENCOUNTER — HOSPITAL ENCOUNTER (INPATIENT)
Age: 62
LOS: 3 days | Discharge: HOME HEALTH | DRG: 682 | End: 2017-12-18
Attending: EMERGENCY MEDICINE | Admitting: FAMILY MEDICINE
Payer: COMMERCIAL

## 2017-12-15 DIAGNOSIS — E87.5 HYPERKALEMIA: Primary | ICD-10-CM

## 2017-12-15 LAB
-: ABNORMAL
-: ABNORMAL
ABO/RH: NORMAL
ABSOLUTE EOS #: 0.28 K/UL (ref 0–0.4)
ABSOLUTE IMMATURE GRANULOCYTE: ABNORMAL K/UL (ref 0–0.3)
ABSOLUTE LYMPH #: 1.11 K/UL (ref 1–4.8)
ABSOLUTE MONO #: 1.11 K/UL (ref 0.2–0.8)
ALBUMIN SERPL-MCNC: 2.8 G/DL (ref 3.5–5.2)
ALBUMIN/GLOBULIN RATIO: ABNORMAL (ref 1–2.5)
ALP BLD-CCNC: 164 U/L (ref 35–104)
ALT SERPL-CCNC: 61 U/L (ref 5–33)
AMORPHOUS: ABNORMAL
AMORPHOUS: ABNORMAL
AMPHETAMINE SCREEN URINE: NEGATIVE
ANION GAP SERPL CALCULATED.3IONS-SCNC: 13 MMOL/L
ANION GAP SERPL CALCULATED.3IONS-SCNC: 14 MMOL/L (ref 9–17)
ANION GAP: 16 MMOL/L (ref 8–16)
ANTIBODY SCREEN: NEGATIVE
ARM BAND NUMBER: NORMAL
AST SERPL-CCNC: 127 U/L
BACTERIA: ABNORMAL
BACTERIA: ABNORMAL
BARBITURATE SCREEN URINE: NEGATIVE
BASOPHILS # BLD: 1 %
BASOPHILS ABSOLUTE: 0.14 K/UL (ref 0–0.2)
BENZODIAZEPINE SCREEN, URINE: NEGATIVE
BILIRUB SERPL-MCNC: 14.71 MG/DL (ref 0.3–1.2)
BILIRUBIN DIRECT: 11.25 MG/DL
BILIRUBIN URINE: ABNORMAL
BILIRUBIN URINE: ABNORMAL
BILIRUBIN, INDIRECT: ABNORMAL MG/DL (ref 0–1)
BNP INTERPRETATION: NORMAL
BUN BLDV-MCNC: 32 MG/DL (ref 8–23)
BUN BLDV-MCNC: 35 MG/DL (ref 8–23)
BUN/CREAT BLD: 26 (ref 9–20)
BUN/CREAT BLD: 28 (ref 9–20)
BUPRENORPHINE URINE: NORMAL
CALCIUM SERPL-MCNC: 8.9 MG/DL (ref 8.6–10.4)
CALCIUM SERPL-MCNC: 9.1 MG/DL (ref 8.6–10.4)
CANNABINOID SCREEN URINE: NEGATIVE
CASTS UA: ABNORMAL /LPF
CASTS UA: ABNORMAL /LPF
CHLORIDE BLD-SCNC: 86 MMOL/L (ref 98–107)
CHLORIDE BLD-SCNC: 88 MMOL/L (ref 98–107)
CO2: 20 MMOL/L (ref 20–31)
CO2: 21 MMOL/L (ref 20–31)
COCAINE METABOLITE, URINE: NEGATIVE
COLOR: ABNORMAL
COLOR: YELLOW
COMMENT UA: ABNORMAL
COMMENT UA: ABNORMAL
CREAT SERPL-MCNC: 1.25 MG/DL (ref 0.5–0.9)
CREAT SERPL-MCNC: 1.25 MG/DL (ref 0.5–0.9)
CREATININE URINE: 61.3 MG/DL (ref 28–217)
CRYSTALS, UA: ABNORMAL /HPF
CRYSTALS, UA: ABNORMAL /HPF
DIFFERENTIAL TYPE: ABNORMAL
DU ANTIGEN: NEGATIVE
EKG ATRIAL RATE: 86 BPM
EKG P AXIS: 21 DEGREES
EKG P-R INTERVAL: 178 MS
EKG Q-T INTERVAL: 364 MS
EKG QRS DURATION: 82 MS
EKG QTC CALCULATION (BAZETT): 435 MS
EKG R AXIS: 16 DEGREES
EKG T AXIS: 24 DEGREES
EKG VENTRICULAR RATE: 86 BPM
EOSINOPHILS RELATIVE PERCENT: 2 % (ref 1–4)
EPITHELIAL CELLS UA: ABNORMAL /HPF (ref 0–5)
EPITHELIAL CELLS UA: ABNORMAL /HPF (ref 0–5)
ETHANOL PERCENT: <0.01 %
ETHANOL: <10 MG/DL
EXPIRATION DATE: NORMAL
GFR AFRICAN AMERICAN: 52 ML/MIN
GFR AFRICAN AMERICAN: 52 ML/MIN
GFR NON-AFRICAN AMERICAN: 43 ML/MIN
GFR NON-AFRICAN AMERICAN: 43 ML/MIN
GFR SERPL CREATININE-BSD FRML MDRD: ABNORMAL ML/MIN/{1.73_M2}
GLOBULIN: ABNORMAL G/DL
GLUCOSE BLD-MCNC: 101 MG/DL (ref 70–99)
GLUCOSE BLD-MCNC: 107 MG/DL (ref 70–99)
GLUCOSE BLD-MCNC: 107 MG/DL (ref 74–106)
GLUCOSE URINE: NEGATIVE
GLUCOSE URINE: NEGATIVE
HCT VFR BLD CALC: 37.9 % (ref 36–46)
HEMOGLOBIN: 12.6 G/DL (ref 12–16)
IMMATURE GRANULOCYTES: ABNORMAL %
INR BLD: 1.5
KETONES, URINE: NEGATIVE
KETONES, URINE: NEGATIVE
LEUKOCYTE ESTERASE, URINE: NEGATIVE
LEUKOCYTE ESTERASE, URINE: NEGATIVE
LIPASE: 59 U/L (ref 13–60)
LYMPHOCYTES # BLD: 8 % (ref 24–44)
MCH RBC QN AUTO: 40.1 PG (ref 26–34)
MCHC RBC AUTO-ENTMCNC: 33.3 G/DL (ref 31–37)
MCV RBC AUTO: 120.5 FL (ref 80–100)
MDMA URINE: NORMAL
METHADONE SCREEN, URINE: NEGATIVE
METHAMPHETAMINE, URINE: NORMAL
MICROALBUMIN/CREAT 24H UR: <12 MG/L
MICROALBUMIN/CREAT UR-RTO: NORMAL MCG/MG CREAT
MONOCYTES # BLD: 8 % (ref 1–7)
MORPHOLOGY: ABNORMAL
MUCUS: ABNORMAL
MUCUS: ABNORMAL
NITRITE, URINE: NEGATIVE
NITRITE, URINE: NEGATIVE
OPIATES, URINE: NEGATIVE
OTHER OBSERVATIONS UA: ABNORMAL
OTHER OBSERVATIONS UA: ABNORMAL
OXYCODONE SCREEN URINE: NEGATIVE
PARTIAL THROMBOPLASTIN TIME: 31.6 SEC (ref 23–31)
PDW BLD-RTO: 13.8 % (ref 11.5–14.5)
PH UA: 6 (ref 5–8)
PH UA: 6 (ref 5–8)
PHENCYCLIDINE, URINE: NEGATIVE
PLATELET # BLD: 278 K/UL (ref 130–400)
PLATELET ESTIMATE: ABNORMAL
PMV BLD AUTO: ABNORMAL FL (ref 6–12)
POC BUN: 36 MG/DL (ref 6–20)
POC CHLORIDE: 92 MMOL/L (ref 98–110)
POC CREATININE: 1.6 MG/DL (ref 0.6–1.4)
POC HEMATOCRIT: 36 % (ref 36–46)
POC HEMOGLOBIN: 12.2 G/DL (ref 12–16)
POC IONIZED CALCIUM: 1.13 MMOL/L (ref 1.13–1.33)
POC POTASSIUM: 6.2 MMOL/L (ref 3.5–5.1)
POC SODIUM: 123 MMOL/L (ref 136–145)
POC TCO2: 22 MMOL/L (ref 20–31)
POTASSIUM SERPL-SCNC: 5.6 MMOL/L (ref 3.7–5.3)
POTASSIUM SERPL-SCNC: 6.3 MMOL/L (ref 3.7–5.3)
PRO-BNP: 291 PG/ML
PROPOXYPHENE, URINE: NORMAL
PROTEIN UA: NEGATIVE
PROTEIN UA: NEGATIVE
PROTHROMBIN TIME: 16 SEC (ref 9.7–11.6)
RBC # BLD: 3.14 M/UL (ref 4–5.2)
RBC # BLD: ABNORMAL 10*6/UL
RBC UA: ABNORMAL /HPF (ref 0–2)
RBC UA: ABNORMAL /HPF (ref 0–2)
RENAL EPITHELIAL, UA: ABNORMAL /HPF
RENAL EPITHELIAL, UA: ABNORMAL /HPF
SEG NEUTROPHILS: 81 % (ref 36–66)
SEGMENTED NEUTROPHILS ABSOLUTE COUNT: 11.26 K/UL (ref 1.8–7.7)
SODIUM BLD-SCNC: 120 MMOL/L (ref 135–144)
SODIUM BLD-SCNC: 122 MMOL/L (ref 135–144)
SODIUM,UR: 63 MMOL/L
SPECIFIC GRAVITY UA: 1.01 (ref 1–1.03)
SPECIFIC GRAVITY UA: 1.01 (ref 1–1.03)
TEST INFORMATION: NORMAL
TOTAL PROTEIN: 6.8 G/DL (ref 6.4–8.3)
TRICHOMONAS: ABNORMAL
TRICHOMONAS: ABNORMAL
TRICYCLIC ANTIDEPRESSANTS, UR: NORMAL
TURBIDITY: CLEAR
TURBIDITY: CLEAR
URINE HGB: NEGATIVE
URINE HGB: NEGATIVE
UROBILINOGEN, URINE: NORMAL
UROBILINOGEN, URINE: NORMAL
WBC # BLD: 13.9 K/UL (ref 3.5–11)
WBC # BLD: ABNORMAL 10*3/UL
WBC UA: ABNORMAL /HPF (ref 0–5)
WBC UA: ABNORMAL /HPF (ref 0–5)
YEAST: ABNORMAL
YEAST: ABNORMAL

## 2017-12-15 PROCEDURE — 82570 ASSAY OF URINE CREATININE: CPT

## 2017-12-15 PROCEDURE — 80307 DRUG TEST PRSMV CHEM ANLYZR: CPT

## 2017-12-15 PROCEDURE — 85014 HEMATOCRIT: CPT

## 2017-12-15 PROCEDURE — 93005 ELECTROCARDIOGRAM TRACING: CPT

## 2017-12-15 PROCEDURE — 6370000000 HC RX 637 (ALT 250 FOR IP): Performed by: FAMILY MEDICINE

## 2017-12-15 PROCEDURE — 81001 URINALYSIS AUTO W/SCOPE: CPT

## 2017-12-15 PROCEDURE — C9113 INJ PANTOPRAZOLE SODIUM, VIA: HCPCS | Performed by: FAMILY MEDICINE

## 2017-12-15 PROCEDURE — 6360000002 HC RX W HCPCS: Performed by: EMERGENCY MEDICINE

## 2017-12-15 PROCEDURE — 82043 UR ALBUMIN QUANTITATIVE: CPT

## 2017-12-15 PROCEDURE — G0480 DRUG TEST DEF 1-7 CLASSES: HCPCS

## 2017-12-15 PROCEDURE — 84300 ASSAY OF URINE SODIUM: CPT

## 2017-12-15 PROCEDURE — 2580000003 HC RX 258: Performed by: EMERGENCY MEDICINE

## 2017-12-15 PROCEDURE — 36415 COLL VENOUS BLD VENIPUNCTURE: CPT

## 2017-12-15 PROCEDURE — 85025 COMPLETE CBC W/AUTO DIFF WBC: CPT

## 2017-12-15 PROCEDURE — 2060000000 HC ICU INTERMEDIATE R&B

## 2017-12-15 PROCEDURE — 99284 EMERGENCY DEPT VISIT MOD MDM: CPT

## 2017-12-15 PROCEDURE — 87086 URINE CULTURE/COLONY COUNT: CPT

## 2017-12-15 PROCEDURE — 6360000002 HC RX W HCPCS: Performed by: FAMILY MEDICINE

## 2017-12-15 PROCEDURE — 85730 THROMBOPLASTIN TIME PARTIAL: CPT

## 2017-12-15 PROCEDURE — 86900 BLOOD TYPING SEROLOGIC ABO: CPT

## 2017-12-15 PROCEDURE — 86850 RBC ANTIBODY SCREEN: CPT

## 2017-12-15 PROCEDURE — 86901 BLOOD TYPING SEROLOGIC RH(D): CPT

## 2017-12-15 PROCEDURE — 85610 PROTHROMBIN TIME: CPT

## 2017-12-15 PROCEDURE — 80047 BASIC METABLC PNL IONIZED CA: CPT

## 2017-12-15 PROCEDURE — 80048 BASIC METABOLIC PNL TOTAL CA: CPT

## 2017-12-15 PROCEDURE — 83880 ASSAY OF NATRIURETIC PEPTIDE: CPT

## 2017-12-15 PROCEDURE — 6370000000 HC RX 637 (ALT 250 FOR IP): Performed by: INTERNAL MEDICINE

## 2017-12-15 PROCEDURE — 6370000000 HC RX 637 (ALT 250 FOR IP): Performed by: EMERGENCY MEDICINE

## 2017-12-15 PROCEDURE — 83690 ASSAY OF LIPASE: CPT

## 2017-12-15 PROCEDURE — 2580000003 HC RX 258: Performed by: FAMILY MEDICINE

## 2017-12-15 PROCEDURE — 80076 HEPATIC FUNCTION PANEL: CPT

## 2017-12-15 RX ORDER — TRAMADOL HYDROCHLORIDE 50 MG/1
50 TABLET ORAL EVERY 6 HOURS PRN
Status: DISCONTINUED | OUTPATIENT
Start: 2017-12-15 | End: 2017-12-18 | Stop reason: HOSPADM

## 2017-12-15 RX ORDER — BISACODYL 10 MG
10 SUPPOSITORY, RECTAL RECTAL DAILY PRN
Status: DISCONTINUED | OUTPATIENT
Start: 2017-12-15 | End: 2017-12-18 | Stop reason: HOSPADM

## 2017-12-15 RX ORDER — TRAMADOL HYDROCHLORIDE 50 MG/1
100 TABLET ORAL EVERY 6 HOURS PRN
Status: DISCONTINUED | OUTPATIENT
Start: 2017-12-15 | End: 2017-12-18 | Stop reason: HOSPADM

## 2017-12-15 RX ORDER — LEVOFLOXACIN 500 MG/1
500 TABLET, FILM COATED ORAL DAILY
Status: ON HOLD | COMMUNITY
End: 2017-12-15

## 2017-12-15 RX ORDER — LANOLIN ALCOHOL/MO/W.PET/CERES
3 CREAM (GRAM) TOPICAL NIGHTLY PRN
Status: DISCONTINUED | OUTPATIENT
Start: 2017-12-15 | End: 2017-12-18 | Stop reason: HOSPADM

## 2017-12-15 RX ORDER — MINERAL OIL/I-PROP MYR/WATER
LOTION (ML) TOPICAL 2 TIMES DAILY PRN
Status: DISCONTINUED | OUTPATIENT
Start: 2017-12-15 | End: 2017-12-18 | Stop reason: HOSPADM

## 2017-12-15 RX ORDER — TRAMADOL HYDROCHLORIDE 50 MG/1
50-100 TABLET ORAL EVERY 8 HOURS PRN
COMMUNITY

## 2017-12-15 RX ORDER — SPIRONOLACTONE 100 MG/1
100 TABLET, FILM COATED ORAL DAILY
Status: ON HOLD | COMMUNITY
End: 2017-12-18

## 2017-12-15 RX ORDER — 0.9 % SODIUM CHLORIDE 0.9 %
10 VIAL (ML) INJECTION ONCE
Status: COMPLETED | OUTPATIENT
Start: 2017-12-15 | End: 2017-12-15

## 2017-12-15 RX ORDER — DOCUSATE SODIUM 100 MG/1
100 CAPSULE, LIQUID FILLED ORAL 2 TIMES DAILY
Status: DISCONTINUED | OUTPATIENT
Start: 2017-12-15 | End: 2017-12-18 | Stop reason: HOSPADM

## 2017-12-15 RX ORDER — SULFAMETHOXAZOLE AND TRIMETHOPRIM 800; 160 MG/1; MG/1
1 TABLET ORAL DAILY
Status: DISCONTINUED | OUTPATIENT
Start: 2017-12-15 | End: 2017-12-15

## 2017-12-15 RX ORDER — DEXTROSE MONOHYDRATE 25 G/50ML
25 INJECTION, SOLUTION INTRAVENOUS PRN
Status: DISCONTINUED | OUTPATIENT
Start: 2017-12-15 | End: 2017-12-18 | Stop reason: HOSPADM

## 2017-12-15 RX ORDER — SODIUM POLYSTYRENE SULFONATE 15 G/60ML
45 SUSPENSION ORAL; RECTAL ONCE
Status: COMPLETED | OUTPATIENT
Start: 2017-12-15 | End: 2017-12-15

## 2017-12-15 RX ORDER — METOPROLOL SUCCINATE 25 MG/1
25 TABLET, EXTENDED RELEASE ORAL DAILY
Status: DISCONTINUED | OUTPATIENT
Start: 2017-12-15 | End: 2017-12-18 | Stop reason: HOSPADM

## 2017-12-15 RX ORDER — SODIUM CHLORIDE 0.9 % (FLUSH) 0.9 %
10 SYRINGE (ML) INJECTION PRN
Status: DISCONTINUED | OUTPATIENT
Start: 2017-12-15 | End: 2017-12-15 | Stop reason: SDUPTHER

## 2017-12-15 RX ORDER — ACETAMINOPHEN 325 MG/1
650 TABLET ORAL EVERY 4 HOURS PRN
Status: DISCONTINUED | OUTPATIENT
Start: 2017-12-15 | End: 2017-12-18 | Stop reason: HOSPADM

## 2017-12-15 RX ORDER — SODIUM CHLORIDE 450 MG/100ML
INJECTION, SOLUTION INTRAVENOUS CONTINUOUS
Status: DISCONTINUED | OUTPATIENT
Start: 2017-12-15 | End: 2017-12-15

## 2017-12-15 RX ORDER — SODIUM CHLORIDE 0.9 % (FLUSH) 0.9 %
10 SYRINGE (ML) INJECTION EVERY 12 HOURS SCHEDULED
Status: DISCONTINUED | OUTPATIENT
Start: 2017-12-15 | End: 2017-12-18 | Stop reason: HOSPADM

## 2017-12-15 RX ORDER — SODIUM CHLORIDE 0.9 % (FLUSH) 0.9 %
10 SYRINGE (ML) INJECTION PRN
Status: DISCONTINUED | OUTPATIENT
Start: 2017-12-15 | End: 2017-12-18 | Stop reason: HOSPADM

## 2017-12-15 RX ORDER — SODIUM CHLORIDE 0.9 % (FLUSH) 0.9 %
10 SYRINGE (ML) INJECTION EVERY 12 HOURS SCHEDULED
Status: DISCONTINUED | OUTPATIENT
Start: 2017-12-15 | End: 2017-12-15 | Stop reason: SDUPTHER

## 2017-12-15 RX ORDER — HYDROXYZINE HYDROCHLORIDE 10 MG/1
10 TABLET, FILM COATED ORAL 3 TIMES DAILY PRN
Status: DISCONTINUED | OUTPATIENT
Start: 2017-12-15 | End: 2017-12-18 | Stop reason: HOSPADM

## 2017-12-15 RX ORDER — SODIUM CHLORIDE 9 MG/ML
INJECTION, SOLUTION INTRAVENOUS CONTINUOUS
Status: DISCONTINUED | OUTPATIENT
Start: 2017-12-15 | End: 2017-12-15

## 2017-12-15 RX ORDER — SODIUM POLYSTYRENE SULFONATE 15 G/60ML
15 SUSPENSION ORAL; RECTAL
Status: ACTIVE | OUTPATIENT
Start: 2017-12-15 | End: 2017-12-15

## 2017-12-15 RX ORDER — LACTULOSE 10 G/15ML
10 SOLUTION ORAL 3 TIMES DAILY
Status: DISCONTINUED | OUTPATIENT
Start: 2017-12-15 | End: 2017-12-18 | Stop reason: HOSPADM

## 2017-12-15 RX ORDER — 0.9 % SODIUM CHLORIDE 0.9 %
1000 INTRAVENOUS SOLUTION INTRAVENOUS ONCE
Status: COMPLETED | OUTPATIENT
Start: 2017-12-15 | End: 2017-12-15

## 2017-12-15 RX ORDER — PANTOPRAZOLE SODIUM 40 MG/10ML
40 INJECTION, POWDER, LYOPHILIZED, FOR SOLUTION INTRAVENOUS DAILY
Status: DISCONTINUED | OUTPATIENT
Start: 2017-12-15 | End: 2017-12-18 | Stop reason: CLARIF

## 2017-12-15 RX ORDER — ONDANSETRON 2 MG/ML
4 INJECTION INTRAMUSCULAR; INTRAVENOUS EVERY 6 HOURS PRN
Status: DISCONTINUED | OUTPATIENT
Start: 2017-12-15 | End: 2017-12-18 | Stop reason: HOSPADM

## 2017-12-15 RX ADMIN — Medication 10 ML: at 21:45

## 2017-12-15 RX ADMIN — Medication 10 ML: at 21:49

## 2017-12-15 RX ADMIN — HYDROXYZINE HYDROCHLORIDE 10 MG: 10 TABLET ORAL at 21:45

## 2017-12-15 RX ADMIN — Medication: at 21:52

## 2017-12-15 RX ADMIN — TRAMADOL HYDROCHLORIDE 50 MG: 50 TABLET, FILM COATED ORAL at 21:41

## 2017-12-15 RX ADMIN — SODIUM POLYSTYRENE SULFONATE 45 G: 15 SUSPENSION ORAL; RECTAL at 18:17

## 2017-12-15 RX ADMIN — PANTOPRAZOLE SODIUM 40 MG: 40 INJECTION, POWDER, FOR SOLUTION INTRAVENOUS at 21:44

## 2017-12-15 RX ADMIN — LACTULOSE 10 G: 20 SOLUTION ORAL at 21:45

## 2017-12-15 RX ADMIN — SODIUM CHLORIDE 1000 ML: 9 INJECTION, SOLUTION INTRAVENOUS at 14:08

## 2017-12-15 RX ADMIN — SODIUM CHLORIDE: 9 INJECTION, SOLUTION INTRAVENOUS at 16:08

## 2017-12-15 RX ADMIN — INSULIN HUMAN 5 UNITS: 100 INJECTION, SOLUTION PARENTERAL at 14:13

## 2017-12-15 RX ADMIN — CALCIUM GLUCONATE 1 G: 94 INJECTION, SOLUTION INTRAVENOUS at 14:25

## 2017-12-15 RX ADMIN — DEXTROSE MONOHYDRATE 25 G: 500 INJECTION PARENTERAL at 14:17

## 2017-12-15 ASSESSMENT — PAIN DESCRIPTION - ONSET: ONSET: ON-GOING

## 2017-12-15 ASSESSMENT — PAIN DESCRIPTION - PROGRESSION: CLINICAL_PROGRESSION: NOT CHANGED

## 2017-12-15 ASSESSMENT — PAIN SCALES - GENERAL
PAINLEVEL_OUTOF10: 4
PAINLEVEL_OUTOF10: 7

## 2017-12-15 ASSESSMENT — PAIN DESCRIPTION - FREQUENCY: FREQUENCY: CONTINUOUS

## 2017-12-15 ASSESSMENT — PAIN DESCRIPTION - PAIN TYPE: TYPE: CHRONIC PAIN

## 2017-12-15 ASSESSMENT — PAIN DESCRIPTION - LOCATION: LOCATION: ABDOMEN

## 2017-12-15 NOTE — CONSULTS
(LASIX) 20 MG tablet Take 1 tablet by mouth daily 11/17/17   Chelsea Graham MD   spironolactone (ALDACTONE) 50 MG tablet Take 1 tablet by mouth daily 11/17/17   Chelsea Graham MD   lactulose (CHRONULAC) 10 GM/15ML solution Take 15 mLs by mouth 3 times daily 11/16/17   Felicia Russell MD   metoprolol succinate (TOPROL XL) 25 MG extended release tablet Take 25 mg by mouth daily    Historical Provider, MD       Scheduled Meds:   lactulose  10 g Oral TID    sodium chloride flush  10 mL Intravenous 2 times per day    docusate sodium  100 mg Oral BID    albuterol  10 mg Nebulization Once    metoprolol succinate  25 mg Oral Daily    pantoprazole  40 mg Intravenous Daily    And    sodium chloride (PF)  10 mL Intravenous Once    sodium polystyrene  45 g Oral Once     Continuous Infusions:   PRN Meds:dextrose, acetaminophen, sodium chloride flush, traMADol **OR** traMADol, bisacodyl, ondansetron, sodium polystyrene    Allergies   Allergen Reactions    Nuts [Peanut-Containing Drug Products] Anaphylaxis and Other (See Comments)     Pt states she can eat peanut butter, however some tree nuts and peanut products cause anaphylaxis. Social History     Social History    Marital status:      Spouse name: N/A    Number of children: N/A    Years of education: N/A     Occupational History    Not on file. Social History Main Topics    Smoking status: Never Smoker    Smokeless tobacco: Never Used    Alcohol use No    Drug use: No    Sexual activity: Not on file     Other Topics Concern    Not on file     Social History Narrative    No narrative on file       History reviewed. No pertinent family history.       Physical Exam:  Vitals:    12/15/17 1425 12/15/17 1440 12/15/17 1455 12/15/17 1523   BP: (!) 142/70 (!) 151/66 (!) 144/69 (!) 147/50   Pulse: 93 91 93 89   Resp: 16 15 19 20   Temp:    98.1 °F (36.7 °C)   TempSrc:    Oral   SpO2: 98% 99% 98% 94%   Weight:    125 lb 3.5 oz (56.8 kg)   Height:    5' 3\" (1.6 m)     No intake/output data recorded. General:  Awake, alert, not in distress. Appears to be stated age. HEENT: Atraumatic, normocephalic. Anicteric sclera. Pink and moist oral mucosa. No carotid bruit. No JVD. Chest: Bilateral air entry, clear to auscultation, no wheezing, rhonchi or rales. Cardiovascular: RRR, S1S2, no murmur, rub or gallop. No lower extremity edema. Abdomen: Soft, distended, non tender to palpation. Active bowel sounds x 4 quadrants. Musculoskeletal: Active ROM x 4 extremities. No cyanosis or clubbing. Integumentary: Pink, warm and dry. Free from rash or lesions. Skin turgor normal.  CNS: Oriented to person, place and time. Speech clear. Face symmetrical. No tremor.      Data:  CBC:   Lab Results   Component Value Date    WBC 13.9 (H) 12/15/2017    HGB 12.6 12/15/2017    HCT 37.9 12/15/2017    .5 (H) 12/15/2017     12/15/2017     BMP:    Lab Results   Component Value Date     (L) 12/15/2017     11/17/2017     11/16/2017    K 6.3 (HH) 12/15/2017    K 4.0 11/17/2017    K 3.4 (L) 11/16/2017    CL 86 (L) 12/15/2017    CL 98 11/17/2017    CL 98 11/16/2017    CO2 21 12/15/2017    CO2 22 11/17/2017    CO2 25 11/16/2017    BUN 35 (H) 12/15/2017    BUN 8 11/17/2017    BUN 9 11/16/2017    CREATININE 1.6 (H) 12/15/2017    CREATININE 1.25 (H) 12/15/2017    CREATININE <0.40 (L) 11/17/2017    GLUCOSE 107 (H) 12/15/2017    GLUCOSE 139 (H) 11/17/2017    GLUCOSE 87 11/16/2017     CMP:   Lab Results   Component Value Date     12/15/2017    K 6.3 12/15/2017    CL 86 12/15/2017    CO2 21 12/15/2017    BUN 35 12/15/2017    CREATININE 1.6 12/15/2017    CREATININE 1.25 12/15/2017    GLUCOSE 107 12/15/2017    CALCIUM 9.1 12/15/2017    PROT 6.8 12/15/2017    LABALBU 2.8 12/15/2017    BILITOT 14.71 12/15/2017    ALKPHOS 164 12/15/2017     12/15/2017    ALT 61 12/15/2017      Hepatic:   Lab Results   Component Value Date     (H) 12/15/2017     (H) 12/07/2017     (H) 11/17/2017    ALT 61 (H) 12/15/2017    ALT 76 (H) 12/07/2017    ALT 68 (H) 11/17/2017    BILITOT 14.71 (H) 12/15/2017    BILITOT 14.81 (H) 12/07/2017    BILITOT 14.66 (H) 11/17/2017    ALKPHOS 164 (H) 12/15/2017    ALKPHOS 155 (H) 12/07/2017    ALKPHOS 187 (H) 11/17/2017     BNP: No results found for: BNP  Lipids: No results found for: CHOL, HDL  INR:   Lab Results   Component Value Date    INR 1.5 12/15/2017    INR 1.6 12/07/2017    INR 1.7 11/17/2017     PTH: No results found for: PTH  Phosphorus:  No results found for: PHOS  Ionized Calcium: No results found for: IONCA  Magnesium: No results found for: MG  Albumin:   Lab Results   Component Value Date    LABALBU 2.8 12/15/2017     Last 3 CK, CKMB, Troponin: @LABRCNT(CKTOTAL:3,CKMB:3,TROPONINI:3)       URINE:)No results found for: [place]     Radiology:   Reviewed. Assessment:  1. Acute kidney injury, appears to be hemodynamically related. 2. Hyponatremia. 3. Hyperkalemia, secondary to DILLON. 4. Cirrhosis with elevated LFTs. Plan:  1. Off diuretics. Hold IVF. 2. Comprehensive urine testing including urinalysis, urine sodium, creatinine (FENa), eosinophils and urine protein and creatinine to quantify any proteinuria will be ordered. 3. Renal diet with oral fluid restriction of 1000 ml/24 hours. Advised her to follow fluid restriction upon discharge. 4. Avoid hypotension, nephrotoxic drugs, Lovenox, Fleets enema and IV contrast exposure. 5. Medications adjusted for low GFR. 6. Follow up chemistries ordered for later today and for AM.    Thank you for the consultation. Please do not hesitate to contact us for any further questions/concerns. We will continue to follow along with you. Electronically signed by Dean Limon MD  on 12/15/2017 at 5:15 PM   [facility].   Ph: [phone]

## 2017-12-15 NOTE — H&P
Brice Pierce MD, [facility]  History and Physical    Patient:  Lucina Yarbrough  MRN: [id number]    CHIEF COMPLAINT:  ' I got your message about my potassium and came to the ER '    History Obtained From:  patient, non-family caregiver - ER physician, nursing, electronic medical record  PCP: Brice Pierce MD    HISTORY OF PRESENT ILLNESS:   The patient is a 60 y.o. female who presents with critical elevation of potassium. Pt has been recently diagnosed with alcoholic hepatitis and cirrhosis. She has been placed in diuretics and has been seeing GI. GI physician had ordered recheck on labs which showed a potassium of 6.5 and acute renal impairment    Pt says she had been lethargic and anorexic for quite some time. Pt had been getting peritoneocentesis in addition. Pt says only in the last couple of days had she felt as if she had more appetite. Pt had also been diagnosed w SBP and was treated with Bactrim    Pt has been on prophylactic BB in addition    Pt denies any chest pain, cough, dyspnea, nausea, vomiting at this time    Pt does c/o anxiety, insomnia, itching all over her body. We note an elevation in Bilirubin over the last month also    Other ROS neg    Past Medical History:        Diagnosis Date    Hypertension     Liver cirrhosis ([facility] [address]        Past Surgical History:    History reviewed. No pertinent surgical history. Medications Prior to Admission:    Prior to Admission medications    Medication Sig Start Date End Date Taking? Authorizing Provider   levofloxacin (LEVAQUIN) 500 MG tablet Take 500 mg by mouth daily   Yes Historical Provider, MD   traMADol (ULTRAM) 50 MG tablet Take  mg by mouth every 8 hours as needed for Pain .    Yes Historical Provider, MD   sulfamethoxazole-trimethoprim (BACTRIM DS;SEPTRA DS) 800-160 MG per tablet Take 1 tablet by mouth daily 12/12/17 1/11/18  Historical Provider, MD   furosemide (LASIX) 20 MG tablet Take 1 tablet by mouth daily 11/17/17   Brice Pierce MD   spironolactone (ALDACTONE) 50 MG tablet Take 1 tablet by mouth daily 11/17/17   Jessica Landon MD   lactulose (CHRONULAC) 10 GM/15ML solution Take 15 mLs by mouth 3 times daily 11/16/17   Jessica Landon MD   metoprolol succinate (TOPROL XL) 25 MG extended release tablet Take 25 mg by mouth daily    Historical Provider, MD       Allergies:  Nuts [peanut-containing drug products]    Social History:   TOBACCO:   reports that she has never smoked. She has never used smokeless tobacco.  ETOH:   reports that she does not drink alcohol. OCCUPATION:      Family History:   History reviewed. No pertinent family history. REVIEW OF SYSTEMS:  Negative except for as above. Physical Exam:    Vitals: BP (!) 147/50   Pulse 89   Temp 98.1 °F (36.7 °C) (Oral)   Resp 20   Ht 5' 3\" (1.6 m)   Wt 125 lb 3.5 oz (56.8 kg)   SpO2 94%   BMI 22.18 kg/m²   General appearance: alert, appears stated age and cooperative. Cachexia with muscle loss over extremities  Skin: Skin color showed severe icterus, texture, turgor normal. Dry skin and several marks of excoriation noted  HEENT: Head: Normocephalic, no lesions, without obvious abnormality.   Neck: no adenopathy, no carotid bruit, no JVD, supple, symmetrical, trachea midline and thyroid not enlarged, symmetric, no tenderness/mass/nodules  Lungs: clear to auscultation bilaterally  Heart: regular rate and rhythm, S1, S2 normal, no S3 or S4 and no rub  Abdomen: Ascites, fluid thrill  Extremities: extremities normal, atraumatic, no cyanosis or edema  Neurologic: Mental status: Alert, oriented, thought content appropriate    CBC:   Recent Labs      12/15/17   1347   WBC  13.9*   HGB  12.6   PLT  278     BMP:    Recent Labs      12/15/17   1347  12/15/17   1359   NA  120*   --    K  6.3*   --    CL  86*   --    CO2  21   --    BUN  35*   --    CREATININE  1.25*  1.6*   GLUCOSE  107*   --      Hepatic:   Recent Labs      12/15/17   1347   AST  127*   ALT  61*   BILITOT  14.71*   ALKPHOS  164* 59 U/L    Comment: Performed at [address] [facility] [place], [address] [facility]    [phone]       Ethanol Alcohol [[id number]] Collected: 12/15/17 1347   Updated: 12/15/17 4882    Specimen Source: Blood     Ethanol <10 mg/dL    Ethanol percent <0.010 %    Comment: Performed at [address] [facility] [place], [address] [facility]    [phone]       Brain Natriuretic Peptide [[id number]] Collected: 12/15/17 1347   Updated: 12/15/17 1445    Specimen Source: Blood     Pro- pg/mL    Comment:  Pro-BNP results cannot be compared to BNP results.        BNP Interpretation         Comment: Pro-BNP Reference Range:         Rule Out:    <300         G[place] Zone:    Age <50     300-450    Age 50-75   300-900    Age >75     300-1800   Usually represents mild to moderate HF but other cardiopulmonary causes cannot    be ruled out.         Rule In:    Age <50     >450    Age 50-75   >900    Age >75    >1800   Performed at [facility] [address], [place] [address] [phone]       CBC with DIFF [[id number]] (Abnormal) Collected: 12/15/17 1347   Updated: 12/15/17 1434    Specimen Source: Blood     WBC 13.9 (H) k/uL    RBC 3.14 (L) m/uL    Hemoglobin 12.6 g/dL    Hematocrit 37.9 %    .5 (H) fL    MCH 40.1 (H) pg    MCHC 33.3 g/dL    RDW 13.8 %    Platelets 884 k/uL    MPV NOT REPORTED fL    Differential Type NOT REPORTED    Immature Granulocytes NOT REPORTED %    Absolute Immature Granulocyte NOT REPORTED k/uL    WBC Morphology NOT REPORTED    RBC Morphology NOT REPORTED    Platelet Estimate NOT REPORTED    Seg Neutrophils 81 (H) %    Lymphocytes 8 (L) %    Monocytes 8 (H) %    Eosinophils % 2 %    Basophils 1 %    Segs Absolute 11.26 (H) k/uL    Absolute Lymph # 1.11 k/uL    Absolute Mono # 1.11 (H) k/uL    Absolute Eos # 0.28 k/uL    Basophils # 0.14 k/uL    Morphology MACROCYTOSIS PRESENT    Comment: Performed at [facility] [address] R Axis 16 degrees    T Axis 24 degrees   Narrative:     Normal sinus rhythm  Cannot rule out Anterior infarct , age undetermined  Abnormal ECG  No previous ECGs available         Assessment and Plan   1. Severe Hyperkalemia  2. Hyponatremia  3. DILLON  4. Cirrhosis  5. Ascites  6. H/o recent ALcoholic hepatitis  7. Cirrhosis   8. Fully compliant w treatment  9. DC Spironolactone  10. Given one dose Lasix earlier  11. Ca gluconate  12. Given dextrose w Insulin already  13. Anxiety  14. Xerosis  15. Hydroxyzine small dose  16. Insomnia  17. Melatonin  18. Will avoid Benzos  19. PPI  20. IS  21. OA  22. Tramadol prn  23. Avoid acetaminophen  24. Avoid Lovenox sec to bleeding risk  25.  Close monitoring    Patient Active Problem List   Diagnosis Code    Cirrhosis ([facility] [address] K74.60    Hypokalemia E87.6    Acute liver failure without hepatic coma K72.00    Generalized abdominal pain R10.84    Jaundice, non- R17    Portal hypertension ([address] N41.8    Alcoholic cirrhosis of liver without ascites ([address] K70.30    Hyperkalemia E87.5       Electronically signed by Josefina Flanagan MD on 12/15/2017 at 4:47 PM

## 2017-12-16 ENCOUNTER — APPOINTMENT (OUTPATIENT)
Dept: ULTRASOUND IMAGING | Age: 62
DRG: 682 | End: 2017-12-16
Payer: COMMERCIAL

## 2017-12-16 PROBLEM — N17.9 AKI (ACUTE KIDNEY INJURY) (HCC): Status: ACTIVE | Noted: 2017-12-16

## 2017-12-16 LAB
ABSOLUTE EOS #: 0.24 K/UL (ref 0–0.4)
ABSOLUTE IMMATURE GRANULOCYTE: ABNORMAL K/UL (ref 0–0.3)
ABSOLUTE LYMPH #: 0.97 K/UL (ref 1–4.8)
ABSOLUTE MONO #: 1.09 K/UL (ref 0.2–0.8)
ALBUMIN SERPL-MCNC: 2.4 G/DL (ref 3.5–5.2)
ALBUMIN/GLOBULIN RATIO: ABNORMAL (ref 1–2.5)
ALP BLD-CCNC: 131 U/L (ref 35–104)
ALT SERPL-CCNC: 51 U/L (ref 5–33)
ANION GAP SERPL CALCULATED.3IONS-SCNC: 13 MMOL/L (ref 9–17)
AST SERPL-CCNC: 105 U/L
BASOPHILS # BLD: 0 %
BASOPHILS ABSOLUTE: 0 K/UL (ref 0–0.2)
BILIRUB SERPL-MCNC: 13.86 MG/DL (ref 0.3–1.2)
BUN BLDV-MCNC: 32 MG/DL (ref 8–23)
BUN/CREAT BLD: 33 (ref 9–20)
CALCIUM SERPL-MCNC: 8.7 MG/DL (ref 8.6–10.4)
CHLORIDE BLD-SCNC: 94 MMOL/L (ref 98–107)
CO2: 20 MMOL/L (ref 20–31)
CREAT SERPL-MCNC: 0.97 MG/DL (ref 0.5–0.9)
CULTURE: NORMAL
CULTURE: NORMAL
DIFFERENTIAL TYPE: ABNORMAL
EOSINOPHILS RELATIVE PERCENT: 2 % (ref 1–4)
GFR AFRICAN AMERICAN: >60 ML/MIN
GFR NON-AFRICAN AMERICAN: 58 ML/MIN
GFR SERPL CREATININE-BSD FRML MDRD: ABNORMAL ML/MIN/{1.73_M2}
GFR SERPL CREATININE-BSD FRML MDRD: ABNORMAL ML/MIN/{1.73_M2}
GLUCOSE BLD-MCNC: 81 MG/DL (ref 70–99)
HCT VFR BLD CALC: 30.3 % (ref 36–46)
HEMOGLOBIN: 10.2 G/DL (ref 12–16)
IMMATURE GRANULOCYTES: ABNORMAL %
INR BLD: 1.6
LYMPHOCYTES # BLD: 8 % (ref 24–44)
Lab: NORMAL
MAGNESIUM: 1.8 MG/DL (ref 1.6–2.6)
MCH RBC QN AUTO: 41 PG (ref 26–34)
MCHC RBC AUTO-ENTMCNC: 33.8 G/DL (ref 31–37)
MCV RBC AUTO: 121.5 FL (ref 80–100)
MONOCYTES # BLD: 9 % (ref 1–7)
MORPHOLOGY: ABNORMAL
PDW BLD-RTO: 14.3 % (ref 11.5–14.5)
PHOSPHORUS: 4.1 MG/DL (ref 2.6–4.5)
PLATELET # BLD: 215 K/UL (ref 130–400)
PLATELET ESTIMATE: ABNORMAL
PMV BLD AUTO: 7.9 FL (ref 6–12)
POTASSIUM SERPL-SCNC: 5.6 MMOL/L (ref 3.7–5.3)
PROTHROMBIN TIME: 16.2 SEC (ref 9.7–11.6)
RBC # BLD: 2.5 M/UL (ref 4–5.2)
RBC # BLD: ABNORMAL 10*6/UL
SEG NEUTROPHILS: 81 % (ref 36–66)
SEGMENTED NEUTROPHILS ABSOLUTE COUNT: 9.8 K/UL (ref 1.8–7.7)
SODIUM BLD-SCNC: 127 MMOL/L (ref 135–144)
SPECIMEN DESCRIPTION: NORMAL
SPECIMEN DESCRIPTION: NORMAL
STATUS: NORMAL
TOTAL PROTEIN: 5.8 G/DL (ref 6.4–8.3)
WBC # BLD: 12.1 K/UL (ref 3.5–11)
WBC # BLD: ABNORMAL 10*3/UL

## 2017-12-16 PROCEDURE — 83735 ASSAY OF MAGNESIUM: CPT

## 2017-12-16 PROCEDURE — 84100 ASSAY OF PHOSPHORUS: CPT

## 2017-12-16 PROCEDURE — 6370000000 HC RX 637 (ALT 250 FOR IP): Performed by: INTERNAL MEDICINE

## 2017-12-16 PROCEDURE — 2580000003 HC RX 258: Performed by: FAMILY MEDICINE

## 2017-12-16 PROCEDURE — C9113 INJ PANTOPRAZOLE SODIUM, VIA: HCPCS | Performed by: FAMILY MEDICINE

## 2017-12-16 PROCEDURE — 6360000002 HC RX W HCPCS: Performed by: FAMILY MEDICINE

## 2017-12-16 PROCEDURE — 85025 COMPLETE CBC W/AUTO DIFF WBC: CPT

## 2017-12-16 PROCEDURE — 76770 US EXAM ABDO BACK WALL COMP: CPT

## 2017-12-16 PROCEDURE — 36415 COLL VENOUS BLD VENIPUNCTURE: CPT

## 2017-12-16 PROCEDURE — 6370000000 HC RX 637 (ALT 250 FOR IP): Performed by: FAMILY MEDICINE

## 2017-12-16 PROCEDURE — 85610 PROTHROMBIN TIME: CPT

## 2017-12-16 PROCEDURE — 2060000000 HC ICU INTERMEDIATE R&B

## 2017-12-16 PROCEDURE — 80053 COMPREHEN METABOLIC PANEL: CPT

## 2017-12-16 RX ORDER — SODIUM POLYSTYRENE SULFONATE 15 G/60ML
15 SUSPENSION ORAL; RECTAL ONCE
Status: COMPLETED | OUTPATIENT
Start: 2017-12-16 | End: 2017-12-16

## 2017-12-16 RX ADMIN — ONDANSETRON 4 MG: 2 INJECTION INTRAMUSCULAR; INTRAVENOUS at 18:36

## 2017-12-16 RX ADMIN — Medication 10 ML: at 21:05

## 2017-12-16 RX ADMIN — METOPROLOL SUCCINATE 25 MG: 25 TABLET, FILM COATED, EXTENDED RELEASE ORAL at 09:55

## 2017-12-16 RX ADMIN — MELATONIN TAB 3 MG 3 MG: 3 TAB at 00:00

## 2017-12-16 RX ADMIN — SODIUM POLYSTYRENE SULFONATE 15 G: 15 SUSPENSION ORAL; RECTAL at 16:36

## 2017-12-16 RX ADMIN — LACTULOSE 10 G: 20 SOLUTION ORAL at 21:05

## 2017-12-16 RX ADMIN — LACTULOSE 10 G: 20 SOLUTION ORAL at 13:18

## 2017-12-16 RX ADMIN — PANTOPRAZOLE SODIUM 40 MG: 40 INJECTION, POWDER, FOR SOLUTION INTRAVENOUS at 09:54

## 2017-12-16 RX ADMIN — Medication 10 ML: at 09:55

## 2017-12-16 RX ADMIN — ONDANSETRON 4 MG: 2 INJECTION INTRAMUSCULAR; INTRAVENOUS at 00:00

## 2017-12-16 NOTE — CONSULTS
Consults   Reason for Consult:  Acute kidney injury. Interval History:    Cr improving  Na trending up  K still elevated at 5.6  No new complaints      HISTORY OF PRESENT ILLNESS:    The patient is a 60 y.o. female who presents with elevated potassium and creatinine noted on routine outpatient labs. On previous labs her serum creatinines have been between <0.4 to 0.7 with eGFR of >60s ml/min. On admission she was noted to have elevated creatinine of 1.25 with potassium level of 6.3. Her lasix, Aldactone and Bactrim were placed on hold. Denies any problems with nausea, vomiting, appetite, diarrhea or difficulty with urination. Denies any recent use of NSAIDs or iv contrast.    Review Of Systems:   Constitutional: No fever, chills, lethargy, weakness or wt loss. HEENT:  No headache, nasal discharge or sore throat. Cardiac:  No chest pain, dyspnea, orthopnea or PND. Chest:              No cough, phlegm or wheezing. Abdomen:  No abdominal pain, nausea, vomiting or diarrhea. Neuro:   No gross focal weakness, numbness, abnormal movements or seizure                          like activity. Skin:   No rashes or itching. :   No hematuria, pyuria, dysuria or flank pain. Extremities:  No swelling or joint pains. Endocrine: No polyuria, polydypsia, or thyroid problems. Hematology:    No bleeding disorders, bruising or anemia. All other ROS is negative. Past Medical History:   Diagnosis Date    Hypertension     Liver cirrhosis ([facility] [address]        History reviewed. No pertinent surgical history. Prior to Admission medications    Medication Sig Start Date End Date Taking? Authorizing Provider   traMADol (ULTRAM) 50 MG tablet Take  mg by mouth every 8 hours as needed for Pain .    Yes Historical Provider, MD   spironolactone (ALDACTONE) 100 MG tablet Take 100 mg by mouth daily   Yes Historical Provider, MD   sulfamethoxazole-trimethoprim (BACTRIM DS;SEPTRA DS) 800-160 MG per tablet Take 1 tablet by mouth daily entry, clear to auscultation, no wheezing, rhonchi or rales. Cardiovascular: RRR, S1S2, no murmur, rub or gallop. 1+ edema. Abdomen: Soft, distended, non tender to palpation. Active bowel sounds x 4 quadrants. Musculoskeletal: Active ROM x 4 extremities. No cyanosis or clubbing. Integumentary: Pink, warm and dry. Free from rash or lesions. Skin turgor normal.  CNS: Oriented to person, place and time. Speech clear. Face symmetrical. No tremor.      Data:  CBC:   Lab Results   Component Value Date    WBC 12.1 (H) 12/16/2017    HGB 10.2 (L) 12/16/2017    HCT 30.3 (L) 12/16/2017    .5 (H) 12/16/2017     12/16/2017     BMP:    Lab Results   Component Value Date     (L) 12/16/2017     (L) 12/15/2017     (L) 12/15/2017    K 5.6 (H) 12/16/2017    K 5.6 (H) 12/15/2017    K 6.3 (HH) 12/15/2017    CL 94 (L) 12/16/2017    CL 88 (L) 12/15/2017    CL 86 (L) 12/15/2017    CO2 20 12/16/2017    CO2 20 12/15/2017    CO2 21 12/15/2017    BUN 32 (H) 12/16/2017    BUN 32 (H) 12/15/2017    BUN 35 (H) 12/15/2017    CREATININE 0.97 (H) 12/16/2017    CREATININE 1.25 (H) 12/15/2017    CREATININE 1.6 (H) 12/15/2017    GLUCOSE 81 12/16/2017    GLUCOSE 101 (H) 12/15/2017    GLUCOSE 107 (H) 12/15/2017     CMP:   Lab Results   Component Value Date     12/16/2017    K 5.6 12/16/2017    CL 94 12/16/2017    CO2 20 12/16/2017    BUN 32 12/16/2017    CREATININE 0.97 12/16/2017    GLUCOSE 81 12/16/2017    CALCIUM 8.7 12/16/2017    PROT 5.8 12/16/2017    LABALBU 2.4 12/16/2017    BILITOT 13.86 12/16/2017    ALKPHOS 131 12/16/2017     12/16/2017    ALT 51 12/16/2017      Hepatic:   Lab Results   Component Value Date     (H) 12/16/2017     (H) 12/15/2017     (H) 12/07/2017    ALT 51 (H) 12/16/2017    ALT 61 (H) 12/15/2017    ALT 76 (H) 12/07/2017    BILITOT 13.86 (H) 12/16/2017    BILITOT 14.71 (H) 12/15/2017    BILITOT 14.81 (H) 12/07/2017    ALKPHOS 131 (H) 12/16/2017    ALKPHOS 164

## 2017-12-16 NOTE — CARE COORDINATION
Case Management Initial Discharge Plan  [facility],         Readmission Risk              Readmission Risk:        13       Age 72 or Greater:  0    Admitted from SNF or Requires Paid or Family Care:  0    Currently has CHF,COPD,ARF,CRI,or is on dialysis:  0    Takes more than 5 Prescription Medications:  0    Takes Digoxin,Insulin,Anticoagulants,Narcotics or ASA/Plavix:  [address] in Past 12 Months:  10    On Disability:  0    Patient Considers own Health:  5            Met with:patient to discuss discharge plans. Information verified: address, contacts, phone number, , insurance Yes  PCP: Irma Perez MD  Date of last visit:     Insurance Provider: Costa benoit    Discharge Planning  Current Residence:   Private residence  Living Arrangements:  Spouse/Significant Other   Home has 2 stories/13 stairs to climb  Support Systems:  Spouse/Significant Other  Current Services PTA:    Supplier: none  Patient able to perform ADL's:Independent  DME used to aid ambulation prior to admission: none/during admission - none    Potential Assistance Needed:  N/A    Pharmacy: [facility] and [place]   Potential Assistance Purchasing Medications:  No  Does patient want to participate in local refill/ meds to beds program?  No    Patient agreeable to home care: No  [place] of choice provided:  n/a      Type of Home Care Services:  None  Patient expects to be discharged to:  home    Prior SNF/Rehab Placement and Facility: no  Agreeable to SNF/Rehab: No  [place] of choice provided: n/a   Evaluation: n/a    Expected Discharge date:  17  Follow Up Appointment: Best Day/ Time: Tuesday AM    Transportation provider:   Transportation arrangements needed for discharge: No    Discharge Plan: Met with patient at bedside. Lives with , is independent and drives. Recently discharged from [facility] and returned the next day for OP paracentesis.   Has had 3-4 paracentesis in

## 2017-12-16 NOTE — FLOWSHEET NOTE
Pt laying in bed with her  Chyna Salazar) at bedside. Pt shared her medical history and states she doesn't feel good. Pt shared her life journey. Pt declined prayer.  provided words of comfort and encouragement. Pt and  both very receptive of  visit and expressed thanks.  remains available as needed. 12/16/17 0052   Encounter Summary   Services provided to: Patient and family together   Referral/Consult From: [facility] (12.16.17)   Complexity of Encounter Low   Length of Encounter 15 minutes   Spiritual Assessment Completed Yes   Routine   Type Initial   Assessment Approachable;Calm; Anxious; Hopeful;Coping   Intervention Active listening;Explored feelings, thoughts, concerns;Explored coping resources;Sustaining presence/ Ministry of presence   Outcome Expressed gratitude;Comfort;Engaged in conversation;Expressed feelings/needs/concerns;Receptive

## 2017-12-16 NOTE — PROGRESS NOTES
Classification: BMI 18.5 - 24.9 Normal Weight  · Comparative Standards (Estimated Nutrition Needs):  · Estimated Daily Total Kcal: 1,900-2,050 kcal (wt gain)  · Estimated Daily Protein (g): 70-80 g    Estimated Intake vs Estimated Needs: Insufficient Data    Nutrition Risk Level: High    Nutrition Interventions:   Continue current diet, Modify current ONS  Continued Inpatient Monitoring    Nutrition Evaluation:   · Evaluation: Goals set   · Goals: PO intake to meet >75% of estimated kcal/protein needs, with good GI tolerance, management of renal labs    · Monitoring: Meal Intake, Supplement Intake, Diet Tolerance, Skin Integrity, Ascites/Edema, Mental Status/Confusion, Weight, Pertinent Labs, Nausea or Vomiting    See Adult Nutrition Doc Flowsheet for more detail.      Electronically signed by Vineet Gonzalez, MICAH, ABIODUN on 12/16/17 at 3:53 PM    Contact Number: [phone]

## 2017-12-16 NOTE — PROGRESS NOTES
Doretha Mcdaniels is a 60 y.o. female patient. Current Facility-Administered Medications   Medication Dose Route Frequency Provider Last Rate Last Dose    dextrose 50 % solution 25 g  25 g Intravenous PRN Jovanny Turner MD   25 g at 12/15/17 1417    acetaminophen (TYLENOL) tablet 650 mg  650 mg Oral Q4H PRN Chelsea Graham MD        lactulose (CHRONULAC) 10 GM/15ML solution 10 g  10 g Oral TID Chelsea Graham MD   10 g at 12/15/17 2145    sodium chloride flush 0.9 % injection 10 mL  10 mL Intravenous 2 times per day Saúl Keys MD   10 mL at 12/16/17 0955    sodium chloride flush 0.9 % injection 10 mL  10 mL Intravenous PRN Chelsea Graham MD        traMADol (ULTRAM) tablet 50 mg  50 mg Oral Q6H PRN Chelsea Graham MD   50 mg at 12/15/17 2141    Or    traMADol (ULTRAM) tablet 100 mg  100 mg Oral Q6H PRN Chelsea Graham MD        docusate sodium (COLACE) capsule 100 mg  100 mg Oral BID Chelsea Graham MD        bisacodyl (DULCOLAX) suppository 10 mg  10 mg Rectal Daily PRN Chelsea Graham MD        ondansetron (ZOFRAN) injection 4 mg  4 mg Intravenous Q6H PRN Chelsea Graham MD   4 mg at 12/16/17 0000    albuterol (PROVENTIL) nebulizer solution 10 mg  10 mg Nebulization Once Saúl Keys MD        metoprolol succinate (TOPROL XL) extended release tablet 25 mg  25 mg Oral Daily Chelsea Graham MD   25 mg at 12/16/17 0955    pantoprazole (PROTONIX) injection 40 mg  40 mg Intravenous Daily Chelsea Graham MD   40 mg at 12/16/17 0954    melatonin tablet 3 mg  3 mg Oral Nightly PRN Chelsea Graham MD   3 mg at 12/16/17 0000    hydrOXYzine (ATARAX) tablet 10 mg  10 mg Oral TID PRN Saúl Keys MD   10 mg at 12/15/17 2145    hydrocerin (EUCERIN) lotion   Topical BID PRN Chelsae Graham MD         Allergies   Allergen Reactions    Nuts [Peanut-Containing Drug Products] Anaphylaxis and Other (See Comments)     Pt states she can eat peanut butter, however some tree nuts and peanut products cause anaphylaxis. ECGs available   US RENAL COMPLETE [[id number]] Collected: 12/16/17 0840   Updated: 12/16/17 0911    Narrative:     EXAMINATION:  RETROPERITONEAL ULTRASOUND OF THE KIDNEYS AND URINARY BLADDER    12/16/2017    COMPARISON:  CT dated 11/13/2017    HISTORY:  ORDERING SYSTEM PROVIDED HISTORY: RENAL FAILURE, ACUTE (KIDNEY INJURY)  TECHNOLOGIST PROVIDED HISTORY:  Reason for exam:->ARF    FINDINGS:  Kidneys: The right kidney measures 11.5 x 6.4 x 6.7 cm.  Left kidney measures 10.8 x  6.0 x 4.2 cm.  Renal cortical thickness is preserved. Kidneys demonstrate normal cortical echogenicity.  No evidence of  hydronephrosis or intrarenal stones.  Doppler flow is demonstrated in both  kidneys. Bladder:    Unremarkable appearance of the bladder.  Prevoid volume is 143 cc. Large amount of intra- abdominal and pelvic ascites is incidentally noted. Impression:     1.  Unremarkable ultrasound of the kidneys and urinary bladder. 2.  Intra-abdominal and pelvic ascites noted.    CBC auto differential [[id number]] (Abnormal) Collected: 12/16/17 0544   Updated: 12/16/17 0757     WBC 12.1 (H) k/uL    RBC 2.50 (L) m/uL    Hemoglobin 10.2 (L) g/dL    Hematocrit 30.3 (L) %    .5 (H) fL    MCH 41.0 (H) pg    MCHC 33.8 g/dL    RDW 14.3 %    Platelets 156 k/uL    MPV 7.9 fL    Differential Type NOT REPORTED    Immature Granulocytes NOT REPORTED %    Absolute Immature Granulocyte NOT REPORTED k/uL    WBC Morphology NOT REPORTED    RBC Morphology NOT REPORTED    Platelet Estimate NOT REPORTED    Seg Neutrophils 81 (H) %    Lymphocytes 8 (L) %    Monocytes 9 (H) %    Eosinophils % 2 %    Basophils 0 %    Segs Absolute 9.80 (H) k/uL    Absolute Lymph # 0.97 (L) k/uL    Absolute Mono # 1.09 (H) k/uL    Absolute Eos # 0.24 k/uL    Basophils # 0.00 k/uL    Morphology MACROCYTOSIS PRESENT    Comment: Performed at [address] [facility], [address] [place] [address]    [phone]       Comprehensive metabolic panel [[id number]] (Abnormal) Collected: 12/16/17 0544   Updated: 12/16/17 0641     Glucose 81 mg/dL    BUN 32 (H) mg/dL    CREATININE 0.97 (H) mg/dL    Comment: ICTERIC SPECIMEN       Bun/Cre Ratio 33 (H)    Calcium 8.7 mg/dL    Sodium 127 (L) mmol/L    Potassium 5.6 (H) mmol/L    Chloride 94 (L) mmol/L    CO2 20 mmol/L    Anion Gap 13 mmol/L    Alkaline Phosphatase 131 (H) U/L    ALT 51 (H) U/L     (H) U/L    Total Bilirubin 13.86 (H) mg/dL    Total Protein 5.8 (L) g/dL    Alb 2.4 (L) g/dL    Albumin/Globulin Ratio NOT REPORTED    GFR Non- 58 (L) mL/min    GFR African American >60 mL/min    GFR Comment         Comment: Average GFR for 61-76 years old:    80 mL/min/1.73sq m   Chronic Kidney Disease:    <60 mL/min/1.73sq m   Kidney failure:    <15 mL/min/1.73sq m               eGFR calculated using average adult body mass. Additional eGFR calculator    available at:         [email or web address]         Performed at [address], [facility]    [phone]        GFR Staging NOT REPORTED   Phosphorus [[id number]] Collected: 12/16/17 0544   Updated: 12/16/17 8818    Specimen Source: Blood     Phosphorus 4.1 mg/dL    Comment: Performed at [address], [facility]    [phone]       Magnesium [[id number]] Collected: 12/16/17 0544   Updated: 12/16/17 6479     Magnesium 1.8 mg/dL    Comment: Performed at [address], [facility]    [phone]       Protime-INR [[id number]] (Abnormal) Collected: 12/16/17 0544   Updated: 12/16/17 0621     Protime 16.2 (H) sec    INR 1.6    Comment:        Therapeutic Range:    Moderate Anticoagulant Intensity:      INR = 2.0-3.0    High Anticoagulant Intensity:      INR = 2.5-3. 5         High anticoagulant intensity for patients with a mechanical prosthetic heart    valve, thrombosis and antiphospholipid syndrome, or Performed at [address] [facility] [address] [facility]    [phone]        GFR Staging NOT REPORTED   Ethanol Alcohol [[id number]] Collected: 12/15/17 1347   Updated: 12/15/17 2047    Specimen Source: Blood     Ethanol <10 mg/dL    Ethanol percent <0.010 %    Comment: Performed at [address] [facility], [facility]    [phone]       Brain Natriuretic Peptide [[id number]] Collected: 12/15/17 1347   Updated: 12/15/17 2047    Specimen Source: Blood     Pro- pg/mL    Comment:  Pro-BNP results cannot be compared to BNP results.        BNP Interpretation         Comment: Pro-BNP Reference Range:         Rule Out:    <300         Grey Zone:    Age <50     300-450    Age 50-75   300-900    Age >75     300-1800   Usually represents mild to moderate HF but other cardiopulmonary causes cannot    be ruled out.         Rule In:    Age <50     >450    Age 50-75   >900    Age >75    >1800   Performed at [address] [place] [address] [address] [place], [place] [address]    [phone]       Liver Profile [[id number]] (Abnormal) Collected: 12/15/17 1347   Updated: 12/15/17 2047    Specimen Source: Blood     Alb 2.8 (L) g/dL    Alkaline Phosphatase 164 (H) U/L    ALT 61 (H) U/L     (H) U/L    Total Bilirubin 14.71 (H) mg/dL    Bilirubin, Direct 11.25 (H) mg/dL    Comment: CORRECTED ON 12/15 AT 2047: PREVIOUSLY REPORTED AS >27.00       Bilirubin, Indirect CANNOT BE CALCULATED mg/dL    Total Protein 6.8 g/dL    Comment: Performed at [address] [facility] [address] [facility], [facility]    [phone]        Globulin NOT REPORTED g/dL    Albumin/Globulin Ratio NOT REPORTED   Basic Metabolic Prof [[id number]] (Abnormal) Collected: 12/15/17 1347   Updated: 12/15/17 2047    Specimen Source: Blood     Glucose 107 (H) mg/dL    BUN 35 (H) mg/dL    CREATININE 1.25 (H) mg/dL    Comment: ICTERIC SPECIMEN       Bun/Cre Ratio 28 (H)    Calcium 9.1 mg/dL NOT REPORTED   Urinalysis with microscopic [[id number]] (Abnormal) Collected: 12/15/17 1902   Updated: 12/15/17 2011    Specimen Source: Urine, clean catch     Color, UA DARK YELLOW (A)    Turbidity UA CLEAR    Glucose, Ur NEGATIVE    Bilirubin Urine Presumptive positive. Unable to confirm due to unavailability of reagent.  (A)    Ketones, Urine NEGATIVE    Specific [place], UA 1.010    Urine Hgb NEGATIVE    pH, UA 6.0    Protein, UA NEGATIVE    Urobilinogen, Urine Normal    Nitrite, Urine NEGATIVE    Leukocyte Esterase, Urine NEGATIVE    Urinalysis Comments NOT REPORTED    -         WBC, UA 0 TO 2 /HPF    RBC, UA None /HPF    Casts UA NOT REPORTED /LPF    Crystals UA NOT REPORTED /HPF    Epithelial Cells UA 10 TO 20 /HPF    Renal Epithelial, Urine NOT REPORTED /HPF    Bacteria, UA MANY (A)    Mucus, UA NOT REPORTED    Trichomonas, UA NOT REPORTED    Amorphous, UA 1+ (A)    Comment: Performed at [address], [address] [place] [address]    [phone]        Other Observations UA NOT REPORTED    Yeast, UA NOT REPORTED   Drug Scr, Abuse, Ur [[id number]] Collected: 12/15/17 1902   Updated: 12/15/17 1918    Specimen Source: Urine, clean catch     Amphetamine Screen, Ur NEGATIVE    Comment:        (Positive cutoff 1000 ng/mL)                    Barbiturate Screen, Ur NEGATIVE    Comment:        (Positive cutoff 200 ng/mL)                    Benzodiazepine Screen, Urine NEGATIVE    Comment:        (Positive cutoff 200 ng/mL)                    Cocaine Metabolite, Urine NEGATIVE    Comment:        (Positive cutoff 300 ng/mL)                    Methadone Screen, Urine NEGATIVE    Comment:        (Positive cutoff 300 ng/mL)                    Opiates, Urine NEGATIVE    Comment:        (Positive cutoff 300 ng/mL)                    Phencyclidine, Urine NEGATIVE    Comment:        (Positive cutoff 25 ng/mL)                    Propoxyphene, Urine NOT REPORTED    Cannabinoid Scrn, Ur NEGATIVE    Comment:        (Positive cutoff 50 ng/mL)                    Oxycodone Screen, Ur NEGATIVE    Comment:        (Positive cutoff 100 ng/mL)                    Methamphetamine, Urine NOT REPORTED    Tricyclic Antidepressants, Ur NOT REPORTED    MDMA URINE NOT REPORTED    Buprenorphine Urine NOT REPORTED    Test Information Assay provides medical screening only.  The absence of expected drug(s) and/or    Comment:  metabolite(s) may indicate diluted or adulterated urine, limitations of testing    or timing of collection. Testing for legal purposes should be confirmed by another method.  To request    confirmation of test result, please call the lab within 7 days of sample    submission.    Performed at [address] [place] [address], [address] [place] [address]    [phone]       Lipase [[id number]] Collected: 12/15/17 1347   Updated: 12/15/17 1459    Specimen Source: Blood     Lipase 59 U/L    Comment: Performed at [address] [place] [address], [address] [place] [address]    [phone]       CBC with DIFF [[id number]] (Abnormal) Collected: 12/15/17 1347   Updated: 12/15/17 1439    Specimen Source: Blood     WBC 13.9 (H) k/uL    RBC 3.14 (L) m/uL    Hemoglobin 12.6 g/dL    Hematocrit 37.9 %    .5 (H) fL    MCH 40.1 (H) pg    MCHC 33.3 g/dL    RDW 13.8 %    Platelets 402 k/uL    MPV NOT REPORTED fL    Differential Type NOT REPORTED    Immature Granulocytes NOT REPORTED %    Absolute Immature Granulocyte NOT REPORTED k/uL    WBC Morphology NOT REPORTED    RBC Morphology NOT REPORTED    Platelet Estimate NOT REPORTED    Seg Neutrophils 81 (H) %    Lymphocytes 8 (L) %    Monocytes 8 (H) %    Eosinophils % 2 %    Basophils 1 %    Segs Absolute 11.26 (H) k/uL    Absolute Lymph # 1.11 k/uL    Absolute Mono # 1.11 (H) k/uL    Absolute Eos # 0.28 k/uL    Basophils # 0.14 k/uL    Morphology MACROCYTOSIS PRESENT    Comment: Performed at [address] [place] [address]    [phone]

## 2017-12-16 NOTE — PLAN OF CARE
Problem: Nutrition  Goal: Optimal nutrition therapy  Nutrition Problem: Severe malnutrition, in context of chronic illness  Intervention: Food and/or Nutrient Delivery: Continue current diet, Modify current ONS  Nutritional Goals: PO intake to meet >75% of estimated kcal/protein needs, with good GI tolerance, management of renal labs  Outcome: Ongoing

## 2017-12-17 LAB
ABSOLUTE EOS #: 0.32 K/UL (ref 0–0.4)
ABSOLUTE IMMATURE GRANULOCYTE: ABNORMAL K/UL (ref 0–0.3)
ABSOLUTE LYMPH #: 0.96 K/UL (ref 1–4.8)
ABSOLUTE MONO #: 0.64 K/UL (ref 0.2–0.8)
ANION GAP SERPL CALCULATED.3IONS-SCNC: 15 MMOL/L (ref 9–17)
BASOPHILS # BLD: 0 %
BASOPHILS ABSOLUTE: 0 K/UL (ref 0–0.2)
BUN BLDV-MCNC: 32 MG/DL (ref 8–23)
BUN/CREAT BLD: 30 (ref 9–20)
CALCIUM SERPL-MCNC: 8.8 MG/DL (ref 8.6–10.4)
CHLORIDE BLD-SCNC: 92 MMOL/L (ref 98–107)
CO2: 23 MMOL/L (ref 20–31)
CREAT SERPL-MCNC: 1.08 MG/DL (ref 0.5–0.9)
DIFFERENTIAL TYPE: ABNORMAL
EOSINOPHILS RELATIVE PERCENT: 3 % (ref 1–4)
GFR AFRICAN AMERICAN: >60 ML/MIN
GFR NON-AFRICAN AMERICAN: 51 ML/MIN
GFR SERPL CREATININE-BSD FRML MDRD: ABNORMAL ML/MIN/{1.73_M2}
GFR SERPL CREATININE-BSD FRML MDRD: ABNORMAL ML/MIN/{1.73_M2}
GLUCOSE BLD-MCNC: 141 MG/DL (ref 70–99)
HCT VFR BLD CALC: 33.6 % (ref 36–46)
HEMOGLOBIN: 11.2 G/DL (ref 12–16)
IMMATURE GRANULOCYTES: ABNORMAL %
LYMPHOCYTES # BLD: 9 % (ref 24–44)
MAGNESIUM: 2 MG/DL (ref 1.6–2.6)
MCH RBC QN AUTO: 40.6 PG (ref 26–34)
MCHC RBC AUTO-ENTMCNC: 33.3 G/DL (ref 31–37)
MCV RBC AUTO: 122 FL (ref 80–100)
MONOCYTES # BLD: 6 % (ref 1–7)
MORPHOLOGY: ABNORMAL
PDW BLD-RTO: 14.3 % (ref 11.5–14.5)
PHOSPHORUS: 3.6 MG/DL (ref 2.6–4.5)
PLATELET # BLD: 235 K/UL (ref 130–400)
PLATELET ESTIMATE: ABNORMAL
PMV BLD AUTO: 8.2 FL (ref 6–12)
POTASSIUM SERPL-SCNC: 4.4 MMOL/L (ref 3.7–5.3)
RBC # BLD: 2.75 M/UL (ref 4–5.2)
RBC # BLD: ABNORMAL 10*6/UL
SEG NEUTROPHILS: 82 % (ref 36–66)
SEGMENTED NEUTROPHILS ABSOLUTE COUNT: 8.78 K/UL (ref 1.8–7.7)
SODIUM BLD-SCNC: 130 MMOL/L (ref 135–144)
WBC # BLD: 10.7 K/UL (ref 3.5–11)
WBC # BLD: ABNORMAL 10*3/UL

## 2017-12-17 PROCEDURE — 85025 COMPLETE CBC W/AUTO DIFF WBC: CPT

## 2017-12-17 PROCEDURE — 83735 ASSAY OF MAGNESIUM: CPT

## 2017-12-17 PROCEDURE — C9113 INJ PANTOPRAZOLE SODIUM, VIA: HCPCS | Performed by: FAMILY MEDICINE

## 2017-12-17 PROCEDURE — 2060000000 HC ICU INTERMEDIATE R&B

## 2017-12-17 PROCEDURE — 6360000002 HC RX W HCPCS: Performed by: FAMILY MEDICINE

## 2017-12-17 PROCEDURE — 2580000003 HC RX 258: Performed by: FAMILY MEDICINE

## 2017-12-17 PROCEDURE — 80048 BASIC METABOLIC PNL TOTAL CA: CPT

## 2017-12-17 PROCEDURE — 84100 ASSAY OF PHOSPHORUS: CPT

## 2017-12-17 PROCEDURE — 36415 COLL VENOUS BLD VENIPUNCTURE: CPT

## 2017-12-17 PROCEDURE — 6370000000 HC RX 637 (ALT 250 FOR IP): Performed by: FAMILY MEDICINE

## 2017-12-17 RX ADMIN — DOCUSATE SODIUM 100 MG: 100 CAPSULE, LIQUID FILLED ORAL at 09:40

## 2017-12-17 RX ADMIN — METOPROLOL SUCCINATE 25 MG: 25 TABLET, FILM COATED, EXTENDED RELEASE ORAL at 09:39

## 2017-12-17 RX ADMIN — LACTULOSE 10 G: 20 SOLUTION ORAL at 13:59

## 2017-12-17 RX ADMIN — LACTULOSE 10 G: 20 SOLUTION ORAL at 21:17

## 2017-12-17 RX ADMIN — LACTULOSE 10 G: 20 SOLUTION ORAL at 09:39

## 2017-12-17 RX ADMIN — Medication 10 ML: at 21:17

## 2017-12-17 RX ADMIN — Medication 10 ML: at 09:40

## 2017-12-17 RX ADMIN — PANTOPRAZOLE SODIUM 40 MG: 40 INJECTION, POWDER, FOR SOLUTION INTRAVENOUS at 09:40

## 2017-12-17 NOTE — PLAN OF CARE
Problem: Bowel/Gastric:  Goal: Bowel function will improve  Bowel function will improve   Outcome: Ongoing  Patient remains continent of bowel, lactulose given per orders, last BM 12/17/17 and was loose/brown. Will continue to monitor for impairments to bowel function.

## 2017-12-17 NOTE — CONSULTS
Consults   Reason for Consult:  Acute kidney injury. Interval History:    Cr improving  Na trending up  K now normal  No new complaints      HISTORY OF PRESENT ILLNESS:    The patient is a 60 y.o. female who presents with elevated potassium and creatinine noted on routine outpatient labs. On previous labs her serum creatinines have been between <0.4 to 0.7 with eGFR of >60s ml/min. On admission she was noted to have elevated creatinine of 1.25 with potassium level of 6.3. Her lasix, Aldactone and Bactrim were placed on hold. Denies any problems with nausea, vomiting, appetite, diarrhea or difficulty with urination. Denies any recent use of NSAIDs or iv contrast.    Review Of Systems:   Constitutional: No fever, chills, lethargy, weakness or wt loss. HEENT:  No headache, nasal discharge or sore throat. Cardiac:  No chest pain, dyspnea, orthopnea or PND. Chest:              No cough, phlegm or wheezing. Abdomen:  No abdominal pain, nausea, vomiting or diarrhea. Neuro:   No gross focal weakness, numbness, abnormal movements or seizure                          like activity. Skin:   No rashes or itching. :   No hematuria, pyuria, dysuria or flank pain. Extremities:  No swelling or joint pains. Endocrine: No polyuria, polydypsia, or thyroid problems. Hematology:    No bleeding disorders, bruising or anemia. All other ROS is negative. Past Medical History:   Diagnosis Date    Hypertension     Liver cirrhosis ([facility] [address]        History reviewed. No pertinent surgical history. Prior to Admission medications    Medication Sig Start Date End Date Taking? Authorizing Provider   traMADol (ULTRAM) 50 MG tablet Take  mg by mouth every 8 hours as needed for Pain .    Yes Historical Provider, MD   spironolactone (ALDACTONE) 100 MG tablet Take 100 mg by mouth daily   Yes Historical Provider, MD   sulfamethoxazole-trimethoprim (BACTRIM DS;SEPTRA DS) 800-160 MG per tablet Take 1 tablet by mouth daily Historical Provider, MD   furosemide (LASIX) 20 MG tablet Take 1 tablet by mouth daily 11/17/17   Brice Pierce MD   lactulose (CHRONULAC) 10 GM/15ML solution Take 15 mLs by mouth 3 times daily 11/16/17   Brice Pierce MD       Scheduled Meds:   lactulose  10 g Oral TID    sodium chloride flush  10 mL Intravenous 2 times per day    docusate sodium  100 mg Oral BID    albuterol  10 mg Nebulization Once    metoprolol succinate  25 mg Oral Daily    pantoprazole  40 mg Intravenous Daily     Continuous Infusions:   PRN Meds:dextrose, acetaminophen, sodium chloride flush, traMADol **OR** traMADol, bisacodyl, ondansetron, melatonin, hydrOXYzine, hydrocerin    Allergies   Allergen Reactions    Nuts [Peanut-Containing Drug Products] Anaphylaxis and Other (See Comments)     Pt states she can eat peanut butter, however some tree nuts and peanut products cause anaphylaxis. Social History     Social History    Marital status:      Spouse name: N/A    Number of children: N/A    Years of education: N/A     Occupational History    Not on file. Social History Main Topics    Smoking status: Never Smoker    Smokeless tobacco: Never Used    Alcohol use No    Drug use: No    Sexual activity: Not on file     Other Topics Concern    Not on file     Social History Narrative    No narrative on file       History reviewed. No pertinent family history. Physical Exam:  Vitals:    12/16/17 1614 12/16/17 1927 12/17/17 0759 12/17/17 1206   BP: 121/60 (!) 107/57 (!) 107/49 (!) 113/40   Pulse: 86 86 86 86   Resp: 18 18 18 16   Temp: 99 °F (37.2 °C) 98.4 °F (36.9 °C) 97.3 °F (36.3 °C) 98.2 °F (36.8 °C)   TempSrc: Oral Oral Oral Oral   SpO2: 96% 96% 99% 98%   Weight:       Height:         I/O last 3 completed shifts: In: 540 [P.O.:540]  Out: 800 [Urine:800]    General:  Awake, alert, not in distress. Appears to be stated age. HEENT: Atraumatic, normocephalic. Icteric sclera. Pink and moist oral mucosa.  No

## 2017-12-17 NOTE — PROGRESS NOTES
Lucina Yarbrough is a 60 y.o. female patient.     Current Facility-Administered Medications   Medication Dose Route Frequency Provider Last Rate Last Dose    dextrose 50 % solution 25 g  25 g Intravenous PRN Magaly Almonte MD   25 g at 12/15/17 1417    acetaminophen (TYLENOL) tablet 650 mg  650 mg Oral Q4H PRN Chelsea Graham MD        lactulose (CHRONULAC) 10 GM/15ML solution 10 g  10 g Oral TID Brice Pierce MD   10 g at 12/17/17 8088    sodium chloride flush 0.9 % injection 10 mL  10 mL Intravenous 2 times per day Brice Pierce MD   10 mL at 12/17/17 0940    sodium chloride flush 0.9 % injection 10 mL  10 mL Intravenous PRN Chelsea Graham MD        traMADol (ULTRAM) tablet 50 mg  50 mg Oral Q6H PRN Chelsea Graham MD   50 mg at 12/15/17 2141    Or    traMADol (ULTRAM) tablet 100 mg  100 mg Oral Q6H PRN Chelsea Graham MD        docusate sodium (COLACE) capsule 100 mg  100 mg Oral BID Brice Pierce MD   100 mg at 12/17/17 0940    bisacodyl (DULCOLAX) suppository 10 mg  10 mg Rectal Daily PRN Chelsea Graham MD        ondansetron (ZOFRAN) injection 4 mg  4 mg Intravenous Q6H PRN Chelsea Graham MD   4 mg at 12/16/17 1836    albuterol (PROVENTIL) nebulizer solution 10 mg  10 mg Nebulization Once Brice Pierce MD        metoprolol succinate (TOPROL XL) extended release tablet 25 mg  25 mg Oral Daily Chelsea Graham MD   25 mg at 12/17/17 0939    pantoprazole (PROTONIX) injection 40 mg  40 mg Intravenous Daily Chelsea Graham MD   40 mg at 12/17/17 0940    melatonin tablet 3 mg  3 mg Oral Nightly PRN Chelsea Graham MD   3 mg at 12/16/17 0000    hydrOXYzine (ATARAX) tablet 10 mg  10 mg Oral TID PRN Brice Pierce MD   10 mg at 12/15/17 2145    hydrocerin (EUCERIN) lotion   Topical BID PRN Chelsea Graham MD         Allergies   Allergen Reactions    Nuts [Peanut-Containing Drug Products] Anaphylaxis and Other (See Comments)     Pt states she can eat peanut butter, however some tree nuts and peanut products cause anaphylaxis. Principal Problem:    Hyperkalemia  Active Problems:    DILLON (acute kidney injury) ([facility])    Blood pressure (!) 113/40, pulse 86, temperature 98.2 °F (36.8 °C), temperature source Oral, resp. rate 16, height 5' 3\" (1.6 m), weight 122 lb 6.4 oz (55.5 kg), SpO2 98 %. Subjective:  Symptoms:  Improved. She reports weakness and anxiety. No shortness of breath, malaise, cough, chest pain, headache, chest pressure, anorexia or diarrhea. Diet:  Poor intake. Activity level: Impaired due to weakness. Pain:  She reports no pain. Objective:  General Appearance:  Comfortable, ill-appearing, in no acute distress and not in pain. Vital signs: (most recent): Blood pressure (!) 110/54, pulse 80, temperature 98.2 °F (36.8 °C), temperature source Oral, resp. rate 16, height 5' 3\" (1.6 m), weight 122 lb 6.4 oz (55.5 kg), SpO2 95 %. HEENT: Normal HEENT exam.    Lungs:  Normal effort and normal respiratory rate. Breath sounds clear to auscultation. She is not in respiratory distress. No stridor. There are decreased breath sounds. No rales. Heart: Normal rate. Regular rhythm. S1 normal and S2 normal.    Chest: No chest wall tenderness. Abdomen: Abdomen is soft and distended. There are signs of ascites. Hypoactive bowel sounds. There is no abdominal tenderness. There is no splenomegaly. There is no hepatomegaly. Extremities: Normal range of motion. There is no dependent edema or local swelling. Neurological: Patient is alert and oriented to person, place and time. Skin:  Warm and dry.        CBC auto differential [[id number]] (Abnormal) Collected: 12/17/17 0539   Updated: 12/17/17 0648     WBC 10.7 k/uL    RBC 2.75 (L) m/uL    Hemoglobin 11.2 (L) g/dL    Hematocrit 33.6 (L) %    .0 (H) fL    MCH 40.6 (H) pg    MCHC 33.3 g/dL    RDW 14.3 %    Platelets 014 k/uL    MPV 8.2 fL    Differential Type NOT REPORTED    Immature Granulocytes NOT REPORTED %    Absolute Immature Granulocyte NOT REPORTED k/uL    WBC Morphology NOT REPORTED    RBC Morphology NOT REPORTED    Platelet Estimate NOT REPORTED    Seg Neutrophils 82 (H) %    Lymphocytes 9 (L) %    Monocytes 6 %    Eosinophils % 3 %    Basophils 0 %    Segs Absolute 8.78 (H) k/uL    Absolute Lymph # 0.96 (L) k/uL    Absolute Mono # 0.64 k/uL    Absolute Eos # 0.32 k/uL    Basophils # 0.00 k/uL    Morphology MACROCYTOSIS PRESENT    Comment: Performed at [address] [facility] [address] [place], [facility]    [phone]       Basic metabolic panel [[id number]] (Abnormal) Collected: 12/17/17 0539   Updated: 12/17/17 0643    Specimen Source: Blood     Glucose 141 (H) mg/dL    BUN 32 (H) mg/dL    CREATININE 1.08 (H) mg/dL    Bun/Cre Ratio 30 (H)    Calcium 8.8 mg/dL    Sodium 130 (L) mmol/L    Potassium 4.4 mmol/L    Chloride 92 (L) mmol/L    CO2 23 mmol/L    Anion Gap 15 mmol/L    GFR Non-African American 51 (L) mL/min    GFR African American >60 mL/min    GFR Comment         Comment: Average GFR for 61-76 years old:    85 mL/min/1.73sq m   Chronic Kidney Disease:    <60 mL/min/1.73sq m   Kidney failure:    <15 mL/min/1.73sq m               eGFR calculated using average adult body mass.  Additional eGFR calculator    available at:         [email or web address]         Performed at [address], [facility]    [phone]        GFR Staging NOT REPORTED   Phosphorus [[id number]] Collected: 12/17/17 0539   Updated: 12/17/17 0643    Specimen Source: Blood     Phosphorus 3.6 mg/dL    Comment: Performed at [address], [facility]    [phone]       Magnesium [[id number]] Collected: 12/17/17 0539   Updated: 12/17/17 1107     Magnesium 2.0 mg/dL    Comment: Performed at [address], [facility]    [phone]       Urine culture clean catch [[id number]] Collected: 12/15/17

## 2017-12-18 VITALS
BODY MASS INDEX: 21.93 KG/M2 | HEIGHT: 63 IN | DIASTOLIC BLOOD PRESSURE: 64 MMHG | WEIGHT: 123.8 LBS | OXYGEN SATURATION: 98 % | SYSTOLIC BLOOD PRESSURE: 112 MMHG | TEMPERATURE: 98.1 F | HEART RATE: 79 BPM | RESPIRATION RATE: 14 BRPM

## 2017-12-18 PROBLEM — E43 SEVERE PROTEIN-CALORIE MALNUTRITION (GOMEZ: LESS THAN 60% OF STANDARD WEIGHT) (HCC): Status: ACTIVE | Noted: 2017-12-18

## 2017-12-18 LAB
ABSOLUTE EOS #: 0.26 K/UL (ref 0–0.4)
ABSOLUTE IMMATURE GRANULOCYTE: ABNORMAL K/UL (ref 0–0.3)
ABSOLUTE LYMPH #: 1.31 K/UL (ref 1–4.8)
ABSOLUTE MONO #: 1.05 K/UL (ref 0.2–0.8)
ANION GAP SERPL CALCULATED.3IONS-SCNC: 14 MMOL/L (ref 9–17)
BASOPHILS # BLD: 0 %
BASOPHILS ABSOLUTE: 0 K/UL (ref 0–0.2)
BUN BLDV-MCNC: 30 MG/DL (ref 8–23)
BUN/CREAT BLD: 37 (ref 9–20)
CALCIUM SERPL-MCNC: 8.4 MG/DL (ref 8.6–10.4)
CHLORIDE BLD-SCNC: 90 MMOL/L (ref 98–107)
CO2: 22 MMOL/L (ref 20–31)
CREAT SERPL-MCNC: 0.81 MG/DL (ref 0.5–0.9)
DIFFERENTIAL TYPE: ABNORMAL
EOSINOPHILS RELATIVE PERCENT: 2 % (ref 1–4)
GFR AFRICAN AMERICAN: >60 ML/MIN
GFR NON-AFRICAN AMERICAN: >60 ML/MIN
GFR SERPL CREATININE-BSD FRML MDRD: ABNORMAL ML/MIN/{1.73_M2}
GFR SERPL CREATININE-BSD FRML MDRD: ABNORMAL ML/MIN/{1.73_M2}
GLUCOSE BLD-MCNC: 91 MG/DL (ref 70–99)
HCT VFR BLD CALC: 31.4 % (ref 36–46)
HEMOGLOBIN: 10.5 G/DL (ref 12–16)
IMMATURE GRANULOCYTES: ABNORMAL %
LYMPHOCYTES # BLD: 10 % (ref 24–44)
MAGNESIUM: 1.8 MG/DL (ref 1.6–2.6)
MCH RBC QN AUTO: 40.7 PG (ref 26–34)
MCHC RBC AUTO-ENTMCNC: 33.5 G/DL (ref 31–37)
MCV RBC AUTO: 121.4 FL (ref 80–100)
MONOCYTES # BLD: 8 % (ref 1–7)
MORPHOLOGY: ABNORMAL
PDW BLD-RTO: 14.4 % (ref 11.5–14.5)
PHOSPHORUS: 3.1 MG/DL (ref 2.6–4.5)
PLATELET # BLD: 236 K/UL (ref 130–400)
PLATELET ESTIMATE: ABNORMAL
PMV BLD AUTO: 8 FL (ref 6–12)
POTASSIUM SERPL-SCNC: 4.9 MMOL/L (ref 3.7–5.3)
RBC # BLD: 2.59 M/UL (ref 4–5.2)
RBC # BLD: ABNORMAL 10*6/UL
SEG NEUTROPHILS: 80 % (ref 36–66)
SEGMENTED NEUTROPHILS ABSOLUTE COUNT: 10.48 K/UL (ref 1.8–7.7)
SODIUM BLD-SCNC: 126 MMOL/L (ref 135–144)
WBC # BLD: 13.1 K/UL (ref 3.5–11)
WBC # BLD: ABNORMAL 10*3/UL

## 2017-12-18 PROCEDURE — 84100 ASSAY OF PHOSPHORUS: CPT

## 2017-12-18 PROCEDURE — 36415 COLL VENOUS BLD VENIPUNCTURE: CPT

## 2017-12-18 PROCEDURE — 6370000000 HC RX 637 (ALT 250 FOR IP): Performed by: FAMILY MEDICINE

## 2017-12-18 PROCEDURE — 83735 ASSAY OF MAGNESIUM: CPT

## 2017-12-18 PROCEDURE — C9113 INJ PANTOPRAZOLE SODIUM, VIA: HCPCS | Performed by: FAMILY MEDICINE

## 2017-12-18 PROCEDURE — 80048 BASIC METABOLIC PNL TOTAL CA: CPT

## 2017-12-18 PROCEDURE — 6360000002 HC RX W HCPCS: Performed by: FAMILY MEDICINE

## 2017-12-18 PROCEDURE — 2580000003 HC RX 258: Performed by: FAMILY MEDICINE

## 2017-12-18 PROCEDURE — 85025 COMPLETE CBC W/AUTO DIFF WBC: CPT

## 2017-12-18 RX ORDER — PANTOPRAZOLE SODIUM 40 MG/1
40 TABLET, DELAYED RELEASE ORAL
Status: DISCONTINUED | OUTPATIENT
Start: 2017-12-19 | End: 2017-12-18 | Stop reason: HOSPADM

## 2017-12-18 RX ORDER — HYDROXYZINE HYDROCHLORIDE 10 MG/1
10 TABLET, FILM COATED ORAL 3 TIMES DAILY PRN
Qty: 30 TABLET | Refills: 0
Start: 2017-12-18 | End: 2017-12-28

## 2017-12-18 RX ORDER — MINERAL OIL/I-PROP MYR/WATER
LOTION (ML) TOPICAL
Qty: 1 BOTTLE | Refills: 1 | Status: SHIPPED | OUTPATIENT
Start: 2017-12-18

## 2017-12-18 RX ORDER — SPIRONOLACTONE 25 MG/1
50 TABLET ORAL DAILY
Status: DISCONTINUED | OUTPATIENT
Start: 2017-12-18 | End: 2017-12-18 | Stop reason: HOSPADM

## 2017-12-18 RX ORDER — LACTULOSE 10 G/15ML
10 SOLUTION ORAL DAILY
Qty: 1 BOTTLE | Refills: 1 | Status: SHIPPED | OUTPATIENT
Start: 2017-12-18

## 2017-12-18 RX ORDER — SPIRONOLACTONE 100 MG/1
50 TABLET, FILM COATED ORAL DAILY
Qty: 30 TABLET | Refills: 0
Start: 2017-12-18

## 2017-12-18 RX ADMIN — PANTOPRAZOLE SODIUM 40 MG: 40 INJECTION, POWDER, FOR SOLUTION INTRAVENOUS at 09:16

## 2017-12-18 RX ADMIN — LACTULOSE 10 G: 20 SOLUTION ORAL at 13:45

## 2017-12-18 RX ADMIN — Medication 10 ML: at 09:16

## 2017-12-18 RX ADMIN — METOPROLOL SUCCINATE 25 MG: 25 TABLET, FILM COATED, EXTENDED RELEASE ORAL at 09:16

## 2017-12-18 RX ADMIN — LACTULOSE 10 G: 20 SOLUTION ORAL at 09:16

## 2017-12-18 ASSESSMENT — PAIN DESCRIPTION - PAIN TYPE: TYPE: CHRONIC PAIN

## 2017-12-18 ASSESSMENT — ENCOUNTER SYMPTOMS
SHORTNESS OF BREATH: 0
COUGH: 0
DIARRHEA: 0

## 2017-12-18 ASSESSMENT — PAIN SCALES - WONG BAKER
WONGBAKER_NUMERICALRESPONSE: 8

## 2017-12-18 ASSESSMENT — PAIN DESCRIPTION - PROGRESSION
CLINICAL_PROGRESSION: NOT CHANGED

## 2017-12-18 ASSESSMENT — PAIN DESCRIPTION - LOCATION: LOCATION: ABDOMEN

## 2017-12-18 ASSESSMENT — PAIN DESCRIPTION - ONSET: ONSET: ON-GOING

## 2017-12-18 ASSESSMENT — PAIN DESCRIPTION - ORIENTATION: ORIENTATION: LOWER

## 2017-12-18 ASSESSMENT — PAIN SCALES - GENERAL: PAINLEVEL_OUTOF10: 3

## 2017-12-18 NOTE — PROGRESS NOTES
José Manuel Parker is a 60 y.o. female patient.     Current Facility-Administered Medications   Medication Dose Route Frequency Provider Last Rate Last Dose    [START ON 12/19/2017] pantoprazole (PROTONIX) tablet 40 mg  40 mg Oral QAM AC Chelsea Graham MD        dextrose 50 % solution 25 g  25 g Intravenous PRN Rona Landaverde MD   25 g at 12/15/17 1417    acetaminophen (TYLENOL) tablet 650 mg  650 mg Oral Q4H PRN Chelsea Graham MD        lactulose (CHRONULAC) 10 GM/15ML solution 10 g  10 g Oral TID Emery Griffith MD   10 g at 12/18/17 1345    sodium chloride flush 0.9 % injection 10 mL  10 mL Intravenous 2 times per day Emery Griffith MD   10 mL at 12/18/17 0916    sodium chloride flush 0.9 % injection 10 mL  10 mL Intravenous PRN Chelsea Graham MD        traMADol (ULTRAM) tablet 50 mg  50 mg Oral Q6H PRN Chelsea Graham MD   50 mg at 12/15/17 2141    Or    traMADol (ULTRAM) tablet 100 mg  100 mg Oral Q6H PRN Chelsea Graham MD        docusate sodium (COLACE) capsule 100 mg  100 mg Oral BID Chelsea Graham MD   100 mg at 12/17/17 0940    bisacodyl (DULCOLAX) suppository 10 mg  10 mg Rectal Daily PRN Chelsea Graham MD        ondansetron (ZOFRAN) injection 4 mg  4 mg Intravenous Q6H PRN Chelsea Graham MD   4 mg at 12/16/17 1836    albuterol (PROVENTIL) nebulizer solution 10 mg  10 mg Nebulization Once Emery Griffith MD        metoprolol succinate (TOPROL XL) extended release tablet 25 mg  25 mg Oral Daily Chelsea Graham MD   25 mg at 12/18/17 0916    melatonin tablet 3 mg  3 mg Oral Nightly PRN Chelsea Graham MD   3 mg at 12/16/17 0000    hydrOXYzine (ATARAX) tablet 10 mg  10 mg Oral TID PRN Emery Griffith MD   10 mg at 12/15/17 2145    hydrocerin (EUCERIN) lotion   Topical BID PRN Chelsea Graham MD         Allergies   Allergen Reactions    Nuts [Peanut-Containing Drug Products] Anaphylaxis and Other (See Comments)     Pt states she can eat peanut butter, however some tree nuts and peanut products cause anaphylaxis.  Bactrim [Sulfamethoxazole-Trimethoprim]      AVOID ALL FUTURE USE PER NEPHROLOGY. Principal Problem:    Hyperkalemia  Active Problems:    Cirrhosis ([facility]    DILLON (acute kidney injury) ([facility]    Severe protein-calorie malnutrition (Sheila Garcia: less than 60% of standard weight) ([facility])    Blood pressure 112/64, pulse 79, temperature 98.1 °F (36.7 °C), temperature source Oral, resp. rate 14, height 5' 3\" (1.6 m), weight 123 lb 12.8 oz (56.2 kg), SpO2 98 %. Subjective:  Symptoms:  Improved. No shortness of breath, malaise, cough, chest pain, weakness, headache, chest pressure, anorexia, diarrhea or anxiety. Diet:  Poor intake. Activity level: Returning to normal.    Pain:  She reports no pain. Objective:  General Appearance:  Comfortable, ill-appearing, in no acute distress and not in pain. Vital signs: (most recent): Blood pressure 112/64, pulse 79, temperature 98.1 °F (36.7 °C), temperature source Oral, resp. rate 14, height 5' 3\" (1.6 m), weight 123 lb 12.8 oz (56.2 kg), SpO2 98 %. Output: Producing urine and producing stool. HEENT: Normal HEENT exam.    Lungs:  Normal effort and normal respiratory rate. Breath sounds clear to auscultation. She is not in respiratory distress. No stridor. There are decreased breath sounds. No rales. Heart: Normal rate. Regular rhythm. S1 normal and S2 normal.    Chest: No chest wall tenderness. Abdomen: Abdomen is soft and distended. There are signs of ascites. Hypoactive bowel sounds. There is no abdominal tenderness. There is no splenomegaly. There is no hepatomegaly. Extremities: Normal range of motion. There is no dependent edema or local swelling. Neurological: Patient is alert and oriented to person, place and time. Skin:  Warm and dry. Assessment:    Condition: In stable condition. Improving.    (Patient Active Problem List:     Cirrhosis ([facility]     Hypokalemia     Acute liver failure without hepatic coma Generalized abdominal pain     Jaundice, non-     Portal hypertension (HCC)     Alcoholic cirrhosis of liver with ascites (HCC)     Hyperkalemia     DILLON (acute kidney injury) ([address]     Severe protein-calorie malnutrition [place] Range: less than 60% of standard weight) ([address]    ). Plan:   (Pt much better  DILLON resolved  Discussed nephrotoxic meds  Needs close monitoring in future  Avoid Lasix, frequent Peritoneocentsis  ALdactone smaller dose now  Avoid Bactrim  Plan DC if OK w Nephrology).        Trenton Dudley MD  2017

## 2017-12-18 NOTE — CONSULTS
Consults   Reason for Consult:  Acute kidney injury. Interval History:    Cr improving to baseline  Na trending down to 126  K now normal  No new complaints      Past Medical History:   Diagnosis Date    Hypertension     Liver cirrhosis ([facility]        History reviewed. No pertinent surgical history. Prior to Admission medications    Medication Sig Start Date End Date Taking? Authorizing Provider   hydrOXYzine (ATARAX) 10 MG tablet Take 1 tablet by mouth 3 times daily as needed for Itching or Anxiety 12/18/17 12/28/17 Yes Jessica Landon MD   hydrocerin (EUCERIN) lotion Apply to skin all over twice a day 12/18/17  Yes Jessica Landon MD   lactulose (CHRONULAC) 10 GM/15ML solution Take 15 mLs by mouth daily 12/18/17  Yes Jessica Landon MD   spironolactone (ALDACTONE) 100 MG tablet Take 0.5 tablets by mouth daily 12/18/17  Yes Jessica Landon MD   traMADol (ULTRAM) 50 MG tablet Take  mg by mouth every 8 hours as needed for Pain . Yes Historical Provider, MD       Scheduled Meds:   [START ON 12/19/2017] pantoprazole  40 mg Oral QAM AC    lactulose  10 g Oral TID    sodium chloride flush  10 mL Intravenous 2 times per day    docusate sodium  100 mg Oral BID    albuterol  10 mg Nebulization Once    metoprolol succinate  25 mg Oral Daily     Continuous Infusions:   PRN Meds:dextrose, acetaminophen, sodium chloride flush, traMADol **OR** traMADol, bisacodyl, ondansetron, melatonin, hydrOXYzine, hydrocerin    Allergies   Allergen Reactions    Nuts [Peanut-Containing Drug Products] Anaphylaxis and Other (See Comments)     Pt states she can eat peanut butter, however some tree nuts and peanut products cause anaphylaxis.  Bactrim [Sulfamethoxazole-Trimethoprim]      AVOID ALL FUTURE USE PER NEPHROLOGY. Social History     Social History    Marital status:      Spouse name: N/A    Number of children: N/A    Years of education: N/A     Occupational History    Not on file.      Social History Main Topics    Smoking status: Never Smoker    Smokeless tobacco: Never Used    Alcohol use No    Drug use: No    Sexual activity: Not on file     Other Topics Concern    Not on file     Social History Narrative    No narrative on file       History reviewed. No pertinent family history. Physical Exam:  Vitals:    12/18/17 0659 12/18/17 0815 12/18/17 1128 12/18/17 1547   BP: (!) 97/40  (!) 117/57 112/64   Pulse: 78  80 79   Resp: 16  16 14   Temp: 98.4 °F (36.9 °C)  98.2 °F (36.8 °C) 98.1 °F (36.7 °C)   TempSrc: Oral  Oral Oral   SpO2: 95%  96% 98%   Weight:  123 lb 12.8 oz (56.2 kg)     Height:         I/O last 3 completed shifts: In: 200 [P.O.:200]  Out: -     General:  Awake, alert, not in distress. Appears to be stated age. HEENT: Atraumatic, normocephalic. Icteric sclera. Pink and moist oral mucosa. No carotid bruit. No JVD. Chest: Bilateral air entry, clear to auscultation, no wheezing, rhonchi or rales. Cardiovascular: RRR, S1S2, no murmur, rub or gallop. 1+ edema. Abdomen: Soft, distended, non tender to palpation. Active bowel sounds x 4 quadrants. Musculoskeletal: Active ROM x 4 extremities. No cyanosis or clubbing. Integumentary: Pink, warm and dry. Free from rash or lesions. Skin turgor normal.  CNS: Oriented to person, place and time. Speech clear. Face symmetrical. No tremor.      Data:  CBC:   Lab Results   Component Value Date    WBC 13.1 (H) 12/18/2017    HGB 10.5 (L) 12/18/2017    HCT 31.4 (L) 12/18/2017    .4 (H) 12/18/2017     12/18/2017     BMP:    Lab Results   Component Value Date     (L) 12/18/2017     (L) 12/17/2017     (L) 12/16/2017    K 4.9 12/18/2017    K 4.4 12/17/2017    K 5.6 (H) 12/16/2017    CL 90 (L) 12/18/2017    CL 92 (L) 12/17/2017    CL 94 (L) 12/16/2017    CO2 22 12/18/2017    CO2 23 12/17/2017    CO2 20 12/16/2017    BUN 30 (H) 12/18/2017    BUN 32 (H) 12/17/2017    BUN 32 (H) 12/16/2017    CREATININE 0.81 12/18/2017 CREATININE 1.08 (H) 12/17/2017    CREATININE 0.97 (H) 12/16/2017    GLUCOSE 91 12/18/2017    GLUCOSE 141 (H) 12/17/2017    GLUCOSE 81 12/16/2017     CMP:   Lab Results   Component Value Date     12/18/2017    K 4.9 12/18/2017    CL 90 12/18/2017    CO2 22 12/18/2017    BUN 30 12/18/2017    CREATININE 0.81 12/18/2017    GLUCOSE 91 12/18/2017    CALCIUM 8.4 12/18/2017    PROT 5.8 12/16/2017    LABALBU 2.4 12/16/2017    BILITOT 13.86 12/16/2017    ALKPHOS 131 12/16/2017     12/16/2017    ALT 51 12/16/2017      Hepatic:   Lab Results   Component Value Date     (H) 12/16/2017     (H) 12/15/2017     (H) 12/07/2017    ALT 51 (H) 12/16/2017    ALT 61 (H) 12/15/2017    ALT 76 (H) 12/07/2017    BILITOT 13.86 (H) 12/16/2017    BILITOT 14.71 (H) 12/15/2017    BILITOT 14.81 (H) 12/07/2017    ALKPHOS 131 (H) 12/16/2017    ALKPHOS 164 (H) 12/15/2017    ALKPHOS 155 (H) 12/07/2017     BNP: No results found for: BNP  Lipids: No results found for: CHOL, HDL  INR:   Lab Results   Component Value Date    INR 1.6 12/16/2017    INR 1.5 12/15/2017    INR 1.6 12/07/2017     PTH: No results found for: PTH  Phosphorus:    Lab Results   Component Value Date    PHOS 3.1 12/18/2017     Ionized Calcium: No results found for: IONCA  Magnesium:   Lab Results   Component Value Date    MG 1.8 12/18/2017     Albumin:   Lab Results   Component Value Date    LABALBU 2.4 12/16/2017     Last 3 CK, CKMB, Troponin: @LABRCNT(CKTOTAL:3,CKMB:3,TROPONINI:3)       URINE:)No results found for: Alexandria Muse     Radiology:   Reviewed. Assessment:    1. Acute kidney injury, appears to be hemodynamically related--improved  2. Hyponatremia. 3. Hyperkalemia, secondary to medications--resolved  4. Cirrhosis with elevated LFTs. Plan:    Pt advised 1000 to 1200 ml per 24 hours fluids restriction  May give aldactone 50 mg po daily  No Bactrim in future  May increase Aldactone dose if need be. FU labs  Renal FU as needed.   Stable

## 2017-12-18 NOTE — FLOWSHEET NOTE
Patient receives Sacrament of the Sick (anointing) from [facility]. [facility] will follow as needed. (writer charting for [facility]     67/05/03 0936   Encounter Summary   Services provided to: Patient   Referral/Consult From: [facility]   Place of [facility] None   Continue Visiting (12/18/17 anointed)   Complexity of Encounter Low   Length of Encounter 15 minutes   Routine   Type Follow up   Sacraments   Sacrament of Sick-Anointing Anointed  (12/18/17 Fr. Angelina Lozoya)

## 2017-12-26 ENCOUNTER — APPOINTMENT (OUTPATIENT)
Dept: GENERAL RADIOLOGY | Age: 62
End: 2017-12-26
Payer: COMMERCIAL

## 2017-12-26 ENCOUNTER — HOSPITAL ENCOUNTER (EMERGENCY)
Age: 62
Discharge: HOME | End: 2017-12-26
Attending: EMERGENCY MEDICINE
Payer: COMMERCIAL

## 2017-12-26 ENCOUNTER — APPOINTMENT (OUTPATIENT)
Dept: INTERVENTIONAL RADIOLOGY/VASCULAR | Age: 62
End: 2017-12-26
Payer: COMMERCIAL

## 2017-12-26 VITALS
BODY MASS INDEX: 21.62 KG/M2 | SYSTOLIC BLOOD PRESSURE: 117 MMHG | HEART RATE: 91 BPM | HEIGHT: 63 IN | OXYGEN SATURATION: 100 % | DIASTOLIC BLOOD PRESSURE: 65 MMHG | TEMPERATURE: 98.3 F | WEIGHT: 122 LBS | RESPIRATION RATE: 16 BRPM

## 2017-12-26 DIAGNOSIS — K70.11 ASCITES DUE TO ALCOHOLIC HEPATITIS: Primary | ICD-10-CM

## 2017-12-26 LAB
ABSOLUTE EOS #: 0.42 K/UL (ref 0–0.4)
ABSOLUTE IMMATURE GRANULOCYTE: ABNORMAL K/UL (ref 0–0.3)
ABSOLUTE LYMPH #: 1.13 K/UL (ref 1–4.8)
ABSOLUTE MONO #: 0.56 K/UL (ref 0.2–0.8)
ALBUMIN SERPL-MCNC: 3 G/DL (ref 3.5–5.2)
ALBUMIN/GLOBULIN RATIO: ABNORMAL (ref 1–2.5)
ALP BLD-CCNC: 158 U/L (ref 35–104)
ALT SERPL-CCNC: 40 U/L (ref 5–33)
ANION GAP SERPL CALCULATED.3IONS-SCNC: 19 MMOL/L (ref 9–17)
AST SERPL-CCNC: 82 U/L
BASOPHILS # BLD: 0 %
BASOPHILS ABSOLUTE: 0 K/UL (ref 0–0.2)
BILIRUB SERPL-MCNC: 14.01 MG/DL (ref 0.3–1.2)
BILIRUBIN DIRECT: 8.76 MG/DL
BILIRUBIN, INDIRECT: 5.25 MG/DL (ref 0–1)
BUN BLDV-MCNC: 47 MG/DL (ref 8–23)
BUN/CREAT BLD: 30 (ref 9–20)
CALCIUM SERPL-MCNC: 9.1 MG/DL (ref 8.6–10.4)
CHLORIDE BLD-SCNC: 89 MMOL/L (ref 98–107)
CO2: 18 MMOL/L (ref 20–31)
CREAT SERPL-MCNC: 1.57 MG/DL (ref 0.5–0.9)
DIFFERENTIAL TYPE: ABNORMAL
EOSINOPHILS RELATIVE PERCENT: 3 % (ref 1–4)
ETHANOL PERCENT: <0.01 %
ETHANOL: <10 MG/DL
GFR AFRICAN AMERICAN: 40 ML/MIN
GFR NON-AFRICAN AMERICAN: 33 ML/MIN
GFR SERPL CREATININE-BSD FRML MDRD: ABNORMAL ML/MIN/{1.73_M2}
GFR SERPL CREATININE-BSD FRML MDRD: ABNORMAL ML/MIN/{1.73_M2}
GLOBULIN: ABNORMAL G/DL (ref 1.5–3.8)
GLUCOSE BLD-MCNC: 123 MG/DL (ref 70–99)
HCT VFR BLD CALC: 34.6 % (ref 36–46)
HEMOGLOBIN: 11.8 G/DL (ref 12–16)
IMMATURE GRANULOCYTES: ABNORMAL %
INR BLD: 1.5
LYMPHOCYTES # BLD: 8 % (ref 24–44)
MCH RBC QN AUTO: 40.7 PG (ref 26–34)
MCHC RBC AUTO-ENTMCNC: 34.1 G/DL (ref 31–37)
MCV RBC AUTO: 119.5 FL (ref 80–100)
MONOCYTES # BLD: 4 % (ref 1–7)
MORPHOLOGY: ABNORMAL
PARTIAL THROMBOPLASTIN TIME: 31.3 SEC (ref 23–31)
PDW BLD-RTO: 13.2 % (ref 11.5–14.5)
PLATELET # BLD: 249 K/UL (ref 130–400)
PLATELET ESTIMATE: ABNORMAL
PMV BLD AUTO: ABNORMAL FL (ref 6–12)
POTASSIUM SERPL-SCNC: 4.2 MMOL/L (ref 3.7–5.3)
PROTHROMBIN TIME: 15.9 SEC (ref 9.7–11.6)
RBC # BLD: 2.9 M/UL (ref 4–5.2)
RBC # BLD: ABNORMAL 10*6/UL
SEG NEUTROPHILS: 85 % (ref 36–66)
SEGMENTED NEUTROPHILS ABSOLUTE COUNT: 11.99 K/UL (ref 1.8–7.7)
SODIUM BLD-SCNC: 126 MMOL/L (ref 135–144)
TOTAL PROTEIN: 6.9 G/DL (ref 6.4–8.3)
WBC # BLD: 14.1 K/UL (ref 3.5–11)
WBC # BLD: ABNORMAL 10*3/UL

## 2017-12-26 PROCEDURE — 49083 ABD PARACENTESIS W/IMAGING: CPT | Performed by: RADIOLOGY

## 2017-12-26 PROCEDURE — C1729 CATH, DRAINAGE: HCPCS

## 2017-12-26 PROCEDURE — G0480 DRUG TEST DEF 1-7 CLASSES: HCPCS

## 2017-12-26 PROCEDURE — 85610 PROTHROMBIN TIME: CPT

## 2017-12-26 PROCEDURE — 80048 BASIC METABOLIC PNL TOTAL CA: CPT

## 2017-12-26 PROCEDURE — 49082 ABD PARACENTESIS: CPT

## 2017-12-26 PROCEDURE — 85025 COMPLETE CBC W/AUTO DIFF WBC: CPT

## 2017-12-26 PROCEDURE — 80076 HEPATIC FUNCTION PANEL: CPT

## 2017-12-26 PROCEDURE — 99285 EMERGENCY DEPT VISIT HI MDM: CPT

## 2017-12-26 PROCEDURE — 85730 THROMBOPLASTIN TIME PARTIAL: CPT

## 2017-12-26 PROCEDURE — 71020 XR CHEST STANDARD TWO VW: CPT

## 2017-12-26 ASSESSMENT — PAIN DESCRIPTION - ORIENTATION: ORIENTATION: RIGHT

## 2017-12-26 ASSESSMENT — PAIN SCALES - GENERAL: PAINLEVEL_OUTOF10: 10

## 2017-12-26 ASSESSMENT — PAIN DESCRIPTION - PAIN TYPE: TYPE: ACUTE PAIN

## 2017-12-26 ASSESSMENT — PAIN DESCRIPTION - LOCATION: LOCATION: ABDOMEN;GENERALIZED

## 2017-12-26 NOTE — ED PROVIDER NOTES
[facility] ED  eMERGENCY dEPARTMENT eNCOUnter      Pt Name: Phill Almodovar  MRN: [id number]  [place] 1955  Date of evaluation: 12/26/2017  Provider: Lupillo Saavedra Dr       Chief Complaint   Patient presents with    Abdominal Pain     rt upper abd pain w/ distension past month / increasing today         HISTORY OF PRESENT ILLNESS  (Location/Symptom, Timing/Onset, Context/Setting, Quality, Duration, Modifying Factors, Severity.)   Phill Almodovar is a 60 y.o. female who presents to the emergency department with Abdominal distention and swelling. This has been an ongoing issue. 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 Last paracentesis earlier this month. States he is supposed to be scheduled outpatient unable to do so due to the holiday. Denies any chest pain or shortness of breath. No definite alleviating or aggravating factors. Symptoms are described as mild, constant with the pain described as a pressure and swelling in the abdomen. Nursing Notes were reviewed. ALLERGIES     Nuts [peanut-containing drug products] and Bactrim [sulfamethoxazole-trimethoprim]    CURRENT MEDICATIONS       Discharge Medication List as of 12/26/2017  2:28 PM      CONTINUE these medications which have NOT CHANGED    Details   METOPROLOL TARTRATE PO Take by mouth Unsure of doseHistorical Med      hydrOXYzine (ATARAX) 10 MG tablet Take 1 tablet by mouth 3 times daily as needed for Itching or Anxiety, Disp-30 tablet, R-0NO PRINT      hydrocerin (EUCERIN) lotion Apply to skin all over twice a day, Disp-1 Bottle, R-1, Normal      lactulose (CHRONULAC) 10 GM/15ML solution Take 15 mLs by mouth daily, Disp-1 Bottle, R-1Normal      spironolactone (ALDACTONE) 100 MG tablet Take 0.5 tablets by mouth daily, Disp-30 tablet, R-0NO PRINT      traMADol (ULTRAM) 50 MG tablet Take  mg by mouth every 8 hours as needed for Pain . Historical Med             PAST MEDICAL HISTORY         Diagnosis Date    Hypertension     Liver cirrhosis ([facility]        SURGICAL HISTORY     History reviewed. No pertinent surgical history. FAMILY HISTORY     History reviewed. No pertinent family history. No family status information on file. SOCIAL HISTORY      reports that she has never smoked. She has never used smokeless tobacco. She reports that she does not drink alcohol or use drugs. REVIEW OF SYSTEMS    (2-9 systems for level 4, 10 or more for level 5)   Review of Systems    Except as noted above the remainder of the review of systems was reviewed and negative. PHYSICAL EXAM    (up to 7 for level 4, 8 or more for level 5)     ED Triage Vitals [12/26/17 1055]   BP Temp Temp Source Pulse Resp SpO2 Height Weight   111/65 98.3 °F (36.8 °C) Oral 81 18 100 % 5' 3\" (1.6 m) 122 lb (55.3 kg)     Physical Exam   Constitutional: She is oriented to person, place, and time. She appears well-developed. HENT:   Head: Normocephalic and atraumatic. Neck: Normal range of motion. Neck supple. Cardiovascular: Normal rate and regular rhythm. Pulmonary/Chest: Effort normal and breath sounds normal.   Abdominal: Soft. She exhibits ascites. Musculoskeletal: Normal range of motion. Neurological: She is alert and oriented to person, place, and time. Skin: Skin is warm. No rash noted. Psychiatric: She has a normal mood and affect.  Her behavior is normal.               DIAGNOSTIC RESULTS     EKG: All EKG's are interpreted by the Emergency Department Physician who either signs or Co-signs this chart in the absence of a cardiologist.        RADIOLOGY:   Non-plain film images such as CT, Ultrasound and MRI are read by the radiologist. Plain radiographic images are visualized and preliminarily interpreted by the emergency physician with the below findings:        Interpretation per the Radiologist below, if available at the time of this note:          ED BEDSIDE ULTRASOUND:   Performed by [facility] due to alcoholic hepatitis          DISPOSITION/PLAN   DISPOSITION Decision To Discharge 12/26/2017 02:26:29 PM      PATIENT REFERRED TO:   Richard Barclay MD  [phone]    In 1 day        DISCHARGE MEDICATIONS:     Discharge Medication List as of 12/26/2017  2:28 PM              (Please note that portions of this note were completed with a voice recognition program.  Efforts were made to edit the dictations but occasionally words are mis-transcribed.)    ARIELLE Saavedra PA-C  12/26/17 1749

## 2017-12-26 NOTE — BRIEF OP NOTE
Brief Postoperative Note    Brandee Erie  YOB: 1955  [id number]    Pre-operative Diagnosis: Ascitis    Post-operative Diagnosis: Same    Procedure: US guided paracenthesis    Anesthesia: Local    Surgeons/Assistants: Dm Montalvo     Estimated Blood Loss: less than 50     Complications: None    Specimens: Was Not Obtained    Findings: 4.5 liters of clear yellow fluid drain from RLQ approach.     Electronically signed by Dm Montalvo MD on 12/26/2017 at 2:03 PM

## 2018-01-10 ENCOUNTER — HOSPITAL ENCOUNTER (OUTPATIENT)
Dept: INTERVENTIONAL RADIOLOGY/VASCULAR | Age: 63
Discharge: HOME | End: 2018-01-10
Payer: COMMERCIAL

## 2018-01-11 ENCOUNTER — HOSPITAL ENCOUNTER (OUTPATIENT)
Dept: INTERVENTIONAL RADIOLOGY/VASCULAR | Age: 63
Discharge: HOME | End: 2018-01-11
Payer: COMMERCIAL

## 2018-01-11 ENCOUNTER — HOSPITAL ENCOUNTER (OUTPATIENT)
Dept: ULTRASOUND IMAGING | Age: 63
Discharge: HOME | End: 2018-01-11
Payer: COMMERCIAL

## 2018-01-11 VITALS — HEART RATE: 87 BPM | DIASTOLIC BLOOD PRESSURE: 68 MMHG | SYSTOLIC BLOOD PRESSURE: 112 MMHG | RESPIRATION RATE: 16 BRPM

## 2018-01-11 DIAGNOSIS — K70.31 ASCITES DUE TO ALCOHOLIC CIRRHOSIS (HCC): ICD-10-CM

## 2018-01-11 PROCEDURE — 49083 ABD PARACENTESIS W/IMAGING: CPT

## 2018-01-11 NOTE — PROGRESS NOTES
Pt tolerated procedure without distress. 3900 ml of clear yellow ascitic fluid removed Dressing applied to procedure site. Discharge instructions reviewed understanding verbalized, pt released ambulatory in nad.